# Patient Record
Sex: MALE | Race: WHITE | NOT HISPANIC OR LATINO | Employment: OTHER | URBAN - METROPOLITAN AREA
[De-identification: names, ages, dates, MRNs, and addresses within clinical notes are randomized per-mention and may not be internally consistent; named-entity substitution may affect disease eponyms.]

---

## 2023-03-02 ENCOUNTER — HOSPITAL ENCOUNTER (INPATIENT)
Facility: MEDICAL CENTER | Age: 73
LOS: 3 days | DRG: 683 | End: 2023-03-05
Attending: EMERGENCY MEDICINE | Admitting: HOSPITALIST
Payer: MEDICARE

## 2023-03-02 ENCOUNTER — APPOINTMENT (OUTPATIENT)
Dept: RADIOLOGY | Facility: MEDICAL CENTER | Age: 73
DRG: 683 | End: 2023-03-02
Attending: HOSPITALIST
Payer: MEDICARE

## 2023-03-02 DIAGNOSIS — R55 NEAR SYNCOPE: ICD-10-CM

## 2023-03-02 DIAGNOSIS — I95.9 HYPOTENSION, UNSPECIFIED HYPOTENSION TYPE: ICD-10-CM

## 2023-03-02 DIAGNOSIS — R79.89 ELEVATED LACTIC ACID LEVEL: ICD-10-CM

## 2023-03-02 DIAGNOSIS — R11.10 VOMITING AND DIARRHEA: ICD-10-CM

## 2023-03-02 DIAGNOSIS — R19.7 VOMITING AND DIARRHEA: ICD-10-CM

## 2023-03-02 DIAGNOSIS — E86.0 DEHYDRATION: ICD-10-CM

## 2023-03-02 PROBLEM — N17.9 ACUTE RENAL FAILURE (HCC): Status: ACTIVE | Noted: 2023-03-02

## 2023-03-02 PROBLEM — R11.2 INTRACTABLE NAUSEA AND VOMITING: Status: ACTIVE | Noted: 2023-03-02

## 2023-03-02 PROBLEM — R73.9 HYPERGLYCEMIA: Status: ACTIVE | Noted: 2023-03-02

## 2023-03-02 PROBLEM — K56.7 ILEUS (HCC): Status: ACTIVE | Noted: 2023-03-02

## 2023-03-02 PROBLEM — E87.20 LACTIC ACIDOSIS: Status: ACTIVE | Noted: 2023-03-02

## 2023-03-02 PROBLEM — I10 HYPERTENSION: Status: ACTIVE | Noted: 2023-03-02

## 2023-03-02 PROBLEM — R14.0 ABDOMINAL DISTENTION: Status: ACTIVE | Noted: 2023-03-02

## 2023-03-02 LAB
ALBUMIN SERPL BCP-MCNC: 4.4 G/DL (ref 3.2–4.9)
ALBUMIN/GLOB SERPL: 1.6 G/DL
ALP SERPL-CCNC: 43 U/L (ref 30–99)
ALT SERPL-CCNC: 29 U/L (ref 2–50)
ANION GAP SERPL CALC-SCNC: 14 MMOL/L (ref 7–16)
AST SERPL-CCNC: 21 U/L (ref 12–45)
BASOPHILS # BLD AUTO: 0.2 % (ref 0–1.8)
BASOPHILS # BLD: 0.02 K/UL (ref 0–0.12)
BILIRUB SERPL-MCNC: 1 MG/DL (ref 0.1–1.5)
BUN SERPL-MCNC: 28 MG/DL (ref 8–22)
CALCIUM ALBUM COR SERPL-MCNC: 8.9 MG/DL (ref 8.5–10.5)
CALCIUM SERPL-MCNC: 9.2 MG/DL (ref 8.5–10.5)
CHLORIDE SERPL-SCNC: 109 MMOL/L (ref 96–112)
CO2 SERPL-SCNC: 22 MMOL/L (ref 20–33)
CREAT SERPL-MCNC: 2.51 MG/DL (ref 0.5–1.4)
EKG IMPRESSION: NORMAL
EOSINOPHIL # BLD AUTO: 0.06 K/UL (ref 0–0.51)
EOSINOPHIL NFR BLD: 0.6 % (ref 0–6.9)
ERYTHROCYTE [DISTWIDTH] IN BLOOD BY AUTOMATED COUNT: 47.3 FL (ref 35.9–50)
GFR SERPLBLD CREATININE-BSD FMLA CKD-EPI: 27 ML/MIN/1.73 M 2
GLOBULIN SER CALC-MCNC: 2.7 G/DL (ref 1.9–3.5)
GLUCOSE SERPL-MCNC: 137 MG/DL (ref 65–99)
HCT VFR BLD AUTO: 52.8 % (ref 42–52)
HGB BLD-MCNC: 17 G/DL (ref 14–18)
IMM GRANULOCYTES # BLD AUTO: 0.05 K/UL (ref 0–0.11)
IMM GRANULOCYTES NFR BLD AUTO: 0.5 % (ref 0–0.9)
INR PPP: 1.2 (ref 0.87–1.13)
LACTATE SERPL-SCNC: 1.9 MMOL/L (ref 0.5–2)
LACTATE SERPL-SCNC: 2.1 MMOL/L (ref 0.5–2)
LIPASE SERPL-CCNC: 24 U/L (ref 11–82)
LYMPHOCYTES # BLD AUTO: 0.28 K/UL (ref 1–4.8)
LYMPHOCYTES NFR BLD: 2.8 % (ref 22–41)
MAGNESIUM SERPL-MCNC: 1.9 MG/DL (ref 1.5–2.5)
MCH RBC QN AUTO: 28.6 PG (ref 27–33)
MCHC RBC AUTO-ENTMCNC: 32.2 G/DL (ref 33.7–35.3)
MCV RBC AUTO: 88.7 FL (ref 81.4–97.8)
MONOCYTES # BLD AUTO: 0.97 K/UL (ref 0–0.85)
MONOCYTES NFR BLD AUTO: 9.8 % (ref 0–13.4)
NEUTROPHILS # BLD AUTO: 8.47 K/UL (ref 1.82–7.42)
NEUTROPHILS NFR BLD: 86.1 % (ref 44–72)
NRBC # BLD AUTO: 0 K/UL
NRBC BLD-RTO: 0 /100 WBC
PLATELET # BLD AUTO: 151 K/UL (ref 164–446)
PMV BLD AUTO: 13 FL (ref 9–12.9)
POTASSIUM SERPL-SCNC: 4.5 MMOL/L (ref 3.6–5.5)
PROT SERPL-MCNC: 7.1 G/DL (ref 6–8.2)
PROTHROMBIN TIME: 15.1 SEC (ref 12–14.6)
RBC # BLD AUTO: 5.95 M/UL (ref 4.7–6.1)
SODIUM SERPL-SCNC: 145 MMOL/L (ref 135–145)
TROPONIN T SERPL-MCNC: 10 NG/L (ref 6–19)
WBC # BLD AUTO: 9.9 K/UL (ref 4.8–10.8)

## 2023-03-02 PROCEDURE — 83690 ASSAY OF LIPASE: CPT

## 2023-03-02 PROCEDURE — 84484 ASSAY OF TROPONIN QUANT: CPT

## 2023-03-02 PROCEDURE — 83605 ASSAY OF LACTIC ACID: CPT

## 2023-03-02 PROCEDURE — 85610 PROTHROMBIN TIME: CPT

## 2023-03-02 PROCEDURE — 84300 ASSAY OF URINE SODIUM: CPT

## 2023-03-02 PROCEDURE — 700111 HCHG RX REV CODE 636 W/ 250 OVERRIDE (IP): Performed by: EMERGENCY MEDICINE

## 2023-03-02 PROCEDURE — A9270 NON-COVERED ITEM OR SERVICE: HCPCS | Performed by: HOSPITALIST

## 2023-03-02 PROCEDURE — 74018 RADEX ABDOMEN 1 VIEW: CPT

## 2023-03-02 PROCEDURE — 700102 HCHG RX REV CODE 250 W/ 637 OVERRIDE(OP): Performed by: HOSPITALIST

## 2023-03-02 PROCEDURE — 82436 ASSAY OF URINE CHLORIDE: CPT

## 2023-03-02 PROCEDURE — 99285 EMERGENCY DEPT VISIT HI MDM: CPT

## 2023-03-02 PROCEDURE — 36415 COLL VENOUS BLD VENIPUNCTURE: CPT

## 2023-03-02 PROCEDURE — 87040 BLOOD CULTURE FOR BACTERIA: CPT

## 2023-03-02 PROCEDURE — 700111 HCHG RX REV CODE 636 W/ 250 OVERRIDE (IP): Performed by: HOSPITALIST

## 2023-03-02 PROCEDURE — 93005 ELECTROCARDIOGRAM TRACING: CPT | Performed by: EMERGENCY MEDICINE

## 2023-03-02 PROCEDURE — 80053 COMPREHEN METABOLIC PANEL: CPT

## 2023-03-02 PROCEDURE — 770001 HCHG ROOM/CARE - MED/SURG/GYN PRIV*

## 2023-03-02 PROCEDURE — 700105 HCHG RX REV CODE 258: Performed by: EMERGENCY MEDICINE

## 2023-03-02 PROCEDURE — 81001 URINALYSIS AUTO W/SCOPE: CPT

## 2023-03-02 PROCEDURE — 83735 ASSAY OF MAGNESIUM: CPT

## 2023-03-02 PROCEDURE — 82043 UR ALBUMIN QUANTITATIVE: CPT

## 2023-03-02 PROCEDURE — 96374 THER/PROPH/DIAG INJ IV PUSH: CPT

## 2023-03-02 PROCEDURE — 84133 ASSAY OF URINE POTASSIUM: CPT

## 2023-03-02 PROCEDURE — 99223 1ST HOSP IP/OBS HIGH 75: CPT | Mod: AI | Performed by: HOSPITALIST

## 2023-03-02 PROCEDURE — 84156 ASSAY OF PROTEIN URINE: CPT

## 2023-03-02 PROCEDURE — 82570 ASSAY OF URINE CREATININE: CPT | Mod: 91

## 2023-03-02 PROCEDURE — 85025 COMPLETE CBC W/AUTO DIFF WBC: CPT

## 2023-03-02 PROCEDURE — 700105 HCHG RX REV CODE 258: Performed by: HOSPITALIST

## 2023-03-02 RX ORDER — GABAPENTIN 300 MG/1
300 CAPSULE ORAL 3 TIMES DAILY PRN
COMMUNITY

## 2023-03-02 RX ORDER — PROPRANOLOL HYDROCHLORIDE 10 MG/1
10 TABLET ORAL 2 TIMES DAILY
Status: DISCONTINUED | OUTPATIENT
Start: 2023-03-02 | End: 2023-03-03

## 2023-03-02 RX ORDER — ONDANSETRON 2 MG/ML
4 INJECTION INTRAMUSCULAR; INTRAVENOUS EVERY 4 HOURS PRN
Status: DISCONTINUED | OUTPATIENT
Start: 2023-03-02 | End: 2023-03-05 | Stop reason: HOSPADM

## 2023-03-02 RX ORDER — ROSUVASTATIN CALCIUM 5 MG/1
5 TABLET, COATED ORAL EVERY EVENING
COMMUNITY

## 2023-03-02 RX ORDER — LEVOTHYROXINE SODIUM 175 UG/1
175 TABLET ORAL
COMMUNITY

## 2023-03-02 RX ORDER — CHLORAL HYDRATE 500 MG
1000 CAPSULE ORAL 2 TIMES DAILY
COMMUNITY

## 2023-03-02 RX ORDER — UBIDECARENONE 200 MG
200 CAPSULE ORAL DAILY
COMMUNITY

## 2023-03-02 RX ORDER — TRAZODONE HYDROCHLORIDE 100 MG/1
50 TABLET ORAL NIGHTLY
Status: DISCONTINUED | OUTPATIENT
Start: 2023-03-02 | End: 2023-03-03

## 2023-03-02 RX ORDER — SODIUM CHLORIDE, SODIUM LACTATE, POTASSIUM CHLORIDE, CALCIUM CHLORIDE 600; 310; 30; 20 MG/100ML; MG/100ML; MG/100ML; MG/100ML
1000 INJECTION, SOLUTION INTRAVENOUS ONCE
Status: COMPLETED | OUTPATIENT
Start: 2023-03-02 | End: 2023-03-02

## 2023-03-02 RX ORDER — VALSARTAN 160 MG/1
160 TABLET ORAL DAILY
Status: ON HOLD | COMMUNITY
End: 2023-03-05

## 2023-03-02 RX ORDER — HYDROXYCHLOROQUINE SULFATE 200 MG/1
200 TABLET, FILM COATED ORAL 2 TIMES DAILY
COMMUNITY

## 2023-03-02 RX ORDER — SODIUM CHLORIDE, SODIUM LACTATE, POTASSIUM CHLORIDE, CALCIUM CHLORIDE 600; 310; 30; 20 MG/100ML; MG/100ML; MG/100ML; MG/100ML
INJECTION, SOLUTION INTRAVENOUS CONTINUOUS
Status: DISCONTINUED | OUTPATIENT
Start: 2023-03-02 | End: 2023-03-05

## 2023-03-02 RX ORDER — SODIUM CHLORIDE, SODIUM LACTATE, POTASSIUM CHLORIDE, AND CALCIUM CHLORIDE .6; .31; .03; .02 G/100ML; G/100ML; G/100ML; G/100ML
30 INJECTION, SOLUTION INTRAVENOUS ONCE
Status: DISCONTINUED | OUTPATIENT
Start: 2023-03-02 | End: 2023-03-02

## 2023-03-02 RX ORDER — ONDANSETRON 2 MG/ML
4 INJECTION INTRAMUSCULAR; INTRAVENOUS ONCE
Status: COMPLETED | OUTPATIENT
Start: 2023-03-02 | End: 2023-03-02

## 2023-03-02 RX ORDER — LEVOCETIRIZINE DIHYDROCHLORIDE 5 MG/1
5 TABLET, FILM COATED ORAL DAILY
COMMUNITY

## 2023-03-02 RX ORDER — IBUPROFEN 200 MG
200-600 TABLET ORAL EVERY 6 HOURS PRN
COMMUNITY

## 2023-03-02 RX ORDER — LEVOTHYROXINE SODIUM 175 UG/1
175 TABLET ORAL
Status: DISCONTINUED | OUTPATIENT
Start: 2023-03-03 | End: 2023-03-03

## 2023-03-02 RX ORDER — TRAZODONE HYDROCHLORIDE 50 MG/1
50 TABLET ORAL NIGHTLY
COMMUNITY

## 2023-03-02 RX ORDER — ROSUVASTATIN CALCIUM 5 MG/1
5 TABLET, COATED ORAL EVERY EVENING
Status: DISCONTINUED | OUTPATIENT
Start: 2023-03-02 | End: 2023-03-03

## 2023-03-02 RX ORDER — TAMSULOSIN HYDROCHLORIDE 0.4 MG/1
0.4 CAPSULE ORAL 2 TIMES DAILY
COMMUNITY

## 2023-03-02 RX ORDER — PROPRANOLOL HYDROCHLORIDE 10 MG/1
10 TABLET ORAL 2 TIMES DAILY
COMMUNITY

## 2023-03-02 RX ORDER — SODIUM CHLORIDE 9 MG/ML
1000 INJECTION, SOLUTION INTRAVENOUS ONCE
Status: COMPLETED | OUTPATIENT
Start: 2023-03-02 | End: 2023-03-02

## 2023-03-02 RX ORDER — ACETAMINOPHEN 325 MG/1
650 TABLET ORAL EVERY 6 HOURS PRN
Status: DISCONTINUED | OUTPATIENT
Start: 2023-03-02 | End: 2023-03-03

## 2023-03-02 RX ORDER — MULTIVIT-MINERALS/FA/LYCOPENE 400-370MCG
1 TABLET ORAL DAILY
COMMUNITY

## 2023-03-02 RX ORDER — ONDANSETRON 4 MG/1
4 TABLET, ORALLY DISINTEGRATING ORAL EVERY 4 HOURS PRN
Status: DISCONTINUED | OUTPATIENT
Start: 2023-03-02 | End: 2023-03-03

## 2023-03-02 RX ADMIN — SODIUM CHLORIDE 1000 ML: 9 INJECTION, SOLUTION INTRAVENOUS at 16:18

## 2023-03-02 RX ADMIN — ONDANSETRON 4 MG: 2 INJECTION INTRAMUSCULAR; INTRAVENOUS at 21:17

## 2023-03-02 RX ADMIN — ONDANSETRON 4 MG: 2 INJECTION INTRAMUSCULAR; INTRAVENOUS at 16:39

## 2023-03-02 RX ADMIN — SODIUM CHLORIDE, POTASSIUM CHLORIDE, SODIUM LACTATE AND CALCIUM CHLORIDE 1000 ML: 600; 310; 30; 20 INJECTION, SOLUTION INTRAVENOUS at 17:51

## 2023-03-02 RX ADMIN — SODIUM CHLORIDE, POTASSIUM CHLORIDE, SODIUM LACTATE AND CALCIUM CHLORIDE: 600; 310; 30; 20 INJECTION, SOLUTION INTRAVENOUS at 21:12

## 2023-03-02 RX ADMIN — ROSUVASTATIN CALCIUM 5 MG: 5 TABLET, COATED ORAL at 21:07

## 2023-03-02 RX ADMIN — TRAZODONE HYDROCHLORIDE 50 MG: 100 TABLET ORAL at 21:07

## 2023-03-02 RX ADMIN — ACETAMINOPHEN 650 MG: 325 TABLET, FILM COATED ORAL at 20:03

## 2023-03-02 ASSESSMENT — LIFESTYLE VARIABLES
TOTAL SCORE: 0
ON A TYPICAL DAY WHEN YOU DRINK ALCOHOL HOW MANY DRINKS DO YOU HAVE: 0
HAVE PEOPLE ANNOYED YOU BY CRITICIZING YOUR DRINKING: NO
TOTAL SCORE: 0
CONSUMPTION TOTAL: NEGATIVE
HOW MANY TIMES IN THE PAST YEAR HAVE YOU HAD 5 OR MORE DRINKS IN A DAY: 0
AVERAGE NUMBER OF DAYS PER WEEK YOU HAVE A DRINK CONTAINING ALCOHOL: 0
EVER HAD A DRINK FIRST THING IN THE MORNING TO STEADY YOUR NERVES TO GET RID OF A HANGOVER: NO
HAVE YOU EVER FELT YOU SHOULD CUT DOWN ON YOUR DRINKING: NO
HAVE PEOPLE ANNOYED YOU BY CRITICIZING YOUR DRINKING: NO
DO YOU DRINK ALCOHOL: NO
TOTAL SCORE: 0
TOTAL SCORE: 0
ALCOHOL_USE: NO
EVER FELT BAD OR GUILTY ABOUT YOUR DRINKING: NO
AVERAGE NUMBER OF DAYS PER WEEK YOU HAVE A DRINK CONTAINING ALCOHOL: 0
SUBSTANCE_ABUSE: 0
TOTAL SCORE: 0
EVER FELT BAD OR GUILTY ABOUT YOUR DRINKING: NO
TOTAL SCORE: 0
EVER HAD A DRINK FIRST THING IN THE MORNING TO STEADY YOUR NERVES TO GET RID OF A HANGOVER: NO
HAVE YOU EVER FELT YOU SHOULD CUT DOWN ON YOUR DRINKING: NO
CONSUMPTION TOTAL: NEGATIVE
HOW MANY TIMES IN THE PAST YEAR HAVE YOU HAD 5 OR MORE DRINKS IN A DAY: 0
ON A TYPICAL DAY WHEN YOU DRINK ALCOHOL HOW MANY DRINKS DO YOU HAVE: 0
DOES PATIENT WANT TO STOP DRINKING: NO

## 2023-03-02 ASSESSMENT — ENCOUNTER SYMPTOMS
FALLS: 0
CONSTIPATION: 0
HEADACHES: 0
MYALGIAS: 0
PALPITATIONS: 0
TINGLING: 0
HEMOPTYSIS: 0
WEAKNESS: 1
SINUS PAIN: 0
STRIDOR: 0
NAUSEA: 1
FLANK PAIN: 0
ORTHOPNEA: 0
BLURRED VISION: 0
BLOOD IN STOOL: 0
SPUTUM PRODUCTION: 0
DIARRHEA: 0
DOUBLE VISION: 0
DEPRESSION: 0
CHILLS: 0
HALLUCINATIONS: 0
POLYDIPSIA: 0
DIZZINESS: 1
PHOTOPHOBIA: 0
WHEEZING: 0
SHORTNESS OF BREATH: 0
TREMORS: 0
SORE THROAT: 0
BRUISES/BLEEDS EASILY: 0
PND: 0
VOMITING: 1
CLAUDICATION: 0
ABDOMINAL PAIN: 1
NECK PAIN: 0
FEVER: 0
HEARTBURN: 0
EYE PAIN: 0
DIAPHORESIS: 0
COUGH: 0
BACK PAIN: 0

## 2023-03-02 ASSESSMENT — COGNITIVE AND FUNCTIONAL STATUS - GENERAL
MOBILITY SCORE: 23
WALKING IN HOSPITAL ROOM: A LITTLE
SUGGESTED CMS G CODE MODIFIER MOBILITY: CI
DAILY ACTIVITIY SCORE: 24
SUGGESTED CMS G CODE MODIFIER DAILY ACTIVITY: CH

## 2023-03-02 ASSESSMENT — PATIENT HEALTH QUESTIONNAIRE - PHQ9
SUM OF ALL RESPONSES TO PHQ9 QUESTIONS 1 AND 2: 0
2. FEELING DOWN, DEPRESSED, IRRITABLE, OR HOPELESS: NOT AT ALL
1. LITTLE INTEREST OR PLEASURE IN DOING THINGS: NOT AT ALL

## 2023-03-02 ASSESSMENT — PAIN DESCRIPTION - PAIN TYPE: TYPE: ACUTE PAIN

## 2023-03-02 ASSESSMENT — FIBROSIS 4 INDEX: FIB4 SCORE: 1.81

## 2023-03-02 NOTE — ED TRIAGE NOTES
"Chief Complaint   Patient presents with    Nausea/Vomiting/Diarrhea     BIBA for vomiting and diarrhea since last night. Pt reports several episodes of near syncope. BP 72/52 for medics, increased to 92/52 with 2500cc fluids.Currently 77/49. Denies dizziness while laying in bed. Recent travel to [place] on Tuesday.     BP (!) 77/49   Pulse 83   Temp 36.5 °C (97.7 °F) (Temporal)   Resp 18   Ht 1.727 m (5' 8\")   Wt 93 kg (205 lb)   SpO2 94%   BMI 31.17 kg/m²     Pt axox4. BP improved to 85/50.  "

## 2023-03-03 ENCOUNTER — APPOINTMENT (OUTPATIENT)
Dept: RADIOLOGY | Facility: MEDICAL CENTER | Age: 73
DRG: 683 | End: 2023-03-03
Payer: MEDICARE

## 2023-03-03 ENCOUNTER — HOSPITAL ENCOUNTER (OUTPATIENT)
Dept: RADIOLOGY | Facility: MEDICAL CENTER | Age: 73
End: 2023-03-03
Attending: HOSPITALIST
Payer: MEDICARE

## 2023-03-03 ENCOUNTER — APPOINTMENT (OUTPATIENT)
Dept: RADIOLOGY | Facility: MEDICAL CENTER | Age: 73
DRG: 683 | End: 2023-03-03
Attending: HOSPITALIST
Payer: MEDICARE

## 2023-03-03 PROBLEM — R19.7 NAUSEA, VOMITING AND DIARRHEA: Status: ACTIVE | Noted: 2023-03-02

## 2023-03-03 LAB
ALBUMIN SERPL BCP-MCNC: 3.6 G/DL (ref 3.2–4.9)
ALBUMIN/GLOB SERPL: 1.5 G/DL
ALP SERPL-CCNC: 34 U/L (ref 30–99)
ALT SERPL-CCNC: 30 U/L (ref 2–50)
ANION GAP SERPL CALC-SCNC: 11 MMOL/L (ref 7–16)
ANION GAP SERPL CALC-SCNC: 13 MMOL/L (ref 7–16)
APPEARANCE UR: CLEAR
AST SERPL-CCNC: 24 U/L (ref 12–45)
BACTERIA #/AREA URNS HPF: ABNORMAL /HPF
BASOPHILS # BLD AUTO: 0.2 % (ref 0–1.8)
BASOPHILS # BLD: 0.01 K/UL (ref 0–0.12)
BILIRUB SERPL-MCNC: 0.6 MG/DL (ref 0.1–1.5)
BILIRUB UR QL STRIP.AUTO: ABNORMAL
BUN SERPL-MCNC: 24 MG/DL (ref 8–22)
BUN SERPL-MCNC: 34 MG/DL (ref 8–22)
CALCIUM ALBUM COR SERPL-MCNC: 8.6 MG/DL (ref 8.5–10.5)
CALCIUM SERPL-MCNC: 8.3 MG/DL (ref 8.5–10.5)
CALCIUM SERPL-MCNC: 8.5 MG/DL (ref 8.5–10.5)
CAOX CRY #/AREA URNS HPF: ABNORMAL /HPF
CHLORIDE SERPL-SCNC: 107 MMOL/L (ref 96–112)
CHLORIDE SERPL-SCNC: 109 MMOL/L (ref 96–112)
CHLORIDE UR-SCNC: <20 MMOL/L
CO2 SERPL-SCNC: 22 MMOL/L (ref 20–33)
CO2 SERPL-SCNC: 25 MMOL/L (ref 20–33)
COLOR UR: YELLOW
CREAT SERPL-MCNC: 1.41 MG/DL (ref 0.5–1.4)
CREAT SERPL-MCNC: 2.28 MG/DL (ref 0.5–1.4)
CREAT UR-MCNC: 415.09 MG/DL
CREAT UR-MCNC: 418.74 MG/DL
EOSINOPHIL # BLD AUTO: 0.21 K/UL (ref 0–0.51)
EOSINOPHIL NFR BLD: 4 % (ref 0–6.9)
EPI CELLS #/AREA URNS HPF: NEGATIVE /HPF
ERYTHROCYTE [DISTWIDTH] IN BLOOD BY AUTOMATED COUNT: 46.8 FL (ref 35.9–50)
EST. AVERAGE GLUCOSE BLD GHB EST-MCNC: 103 MG/DL
GFR SERPLBLD CREATININE-BSD FMLA CKD-EPI: 30 ML/MIN/1.73 M 2
GFR SERPLBLD CREATININE-BSD FMLA CKD-EPI: 53 ML/MIN/1.73 M 2
GLOBULIN SER CALC-MCNC: 2.4 G/DL (ref 1.9–3.5)
GLUCOSE SERPL-MCNC: 100 MG/DL (ref 65–99)
GLUCOSE SERPL-MCNC: 110 MG/DL (ref 65–99)
GLUCOSE UR STRIP.AUTO-MCNC: NEGATIVE MG/DL
HBA1C MFR BLD: 5.2 % (ref 4–5.6)
HCT VFR BLD AUTO: 45.7 % (ref 42–52)
HGB BLD-MCNC: 14.9 G/DL (ref 14–18)
HYALINE CASTS #/AREA URNS LPF: ABNORMAL /LPF
IMM GRANULOCYTES # BLD AUTO: 0.02 K/UL (ref 0–0.11)
IMM GRANULOCYTES NFR BLD AUTO: 0.4 % (ref 0–0.9)
KETONES UR STRIP.AUTO-MCNC: ABNORMAL MG/DL
LACTOFERRIN STL QL IA: POSITIVE
LEUKOCYTE ESTERASE UR QL STRIP.AUTO: NEGATIVE
LYMPHOCYTES # BLD AUTO: 0.83 K/UL (ref 1–4.8)
LYMPHOCYTES NFR BLD: 15.6 % (ref 22–41)
MAGNESIUM SERPL-MCNC: 1.7 MG/DL (ref 1.5–2.5)
MCH RBC QN AUTO: 28.8 PG (ref 27–33)
MCHC RBC AUTO-ENTMCNC: 32.6 G/DL (ref 33.7–35.3)
MCV RBC AUTO: 88.4 FL (ref 81.4–97.8)
MICRO URNS: ABNORMAL
MICROALBUMIN UR-MCNC: 14.3 MG/DL
MICROALBUMIN/CREAT UR: 34 MG/G (ref 0–30)
MONOCYTES # BLD AUTO: 0.94 K/UL (ref 0–0.85)
MONOCYTES NFR BLD AUTO: 17.7 % (ref 0–13.4)
MUCOUS THREADS #/AREA URNS HPF: ABNORMAL /HPF
NEUTROPHILS # BLD AUTO: 3.3 K/UL (ref 1.82–7.42)
NEUTROPHILS NFR BLD: 62.1 % (ref 44–72)
NITRITE UR QL STRIP.AUTO: NEGATIVE
NRBC # BLD AUTO: 0 K/UL
NRBC BLD-RTO: 0 /100 WBC
PH UR STRIP.AUTO: 5.5 [PH] (ref 5–8)
PHOSPHATE SERPL-MCNC: 2.8 MG/DL (ref 2.5–4.5)
PLATELET # BLD AUTO: 117 K/UL (ref 164–446)
PMV BLD AUTO: 13.1 FL (ref 9–12.9)
POTASSIUM SERPL-SCNC: 3.9 MMOL/L (ref 3.6–5.5)
POTASSIUM SERPL-SCNC: 3.9 MMOL/L (ref 3.6–5.5)
POTASSIUM UR-SCNC: >100 MMOL/L
PROT SERPL-MCNC: 6 G/DL (ref 6–8.2)
PROT UR QL STRIP: 30 MG/DL
PROT UR-MCNC: 48 MG/DL (ref 0–15)
PROT/CREAT UR: 116 MG/G (ref 15–68)
RBC # BLD AUTO: 5.17 M/UL (ref 4.7–6.1)
RBC # URNS HPF: ABNORMAL /HPF
RBC UR QL AUTO: NEGATIVE
SODIUM SERPL-SCNC: 142 MMOL/L (ref 135–145)
SODIUM SERPL-SCNC: 145 MMOL/L (ref 135–145)
SODIUM UR-SCNC: 24 MMOL/L
SP GR UR STRIP.AUTO: >=1.03
TSH SERPL DL<=0.005 MIU/L-ACNC: 0.69 UIU/ML (ref 0.38–5.33)
UROBILINOGEN UR STRIP.AUTO-MCNC: 0.2 MG/DL
WBC # BLD AUTO: 5.3 K/UL (ref 4.8–10.8)
WBC #/AREA URNS HPF: ABNORMAL /HPF

## 2023-03-03 PROCEDURE — 85025 COMPLETE CBC W/AUTO DIFF WBC: CPT

## 2023-03-03 PROCEDURE — 80048 BASIC METABOLIC PNL TOTAL CA: CPT

## 2023-03-03 PROCEDURE — 700105 HCHG RX REV CODE 258: Performed by: HOSPITALIST

## 2023-03-03 PROCEDURE — 74176 CT ABD & PELVIS W/O CONTRAST: CPT

## 2023-03-03 PROCEDURE — 700111 HCHG RX REV CODE 636 W/ 250 OVERRIDE (IP): Performed by: GENERAL PRACTICE

## 2023-03-03 PROCEDURE — 83036 HEMOGLOBIN GLYCOSYLATED A1C: CPT

## 2023-03-03 PROCEDURE — 700105 HCHG RX REV CODE 258

## 2023-03-03 PROCEDURE — A9270 NON-COVERED ITEM OR SERVICE: HCPCS | Performed by: HOSPITALIST

## 2023-03-03 PROCEDURE — 83630 LACTOFERRIN FECAL (QUAL): CPT

## 2023-03-03 PROCEDURE — 99222 1ST HOSP IP/OBS MODERATE 55: CPT | Performed by: SURGERY

## 2023-03-03 PROCEDURE — 700102 HCHG RX REV CODE 250 W/ 637 OVERRIDE(OP): Performed by: HOSPITALIST

## 2023-03-03 PROCEDURE — 84100 ASSAY OF PHOSPHORUS: CPT

## 2023-03-03 PROCEDURE — 770001 HCHG ROOM/CARE - MED/SURG/GYN PRIV*

## 2023-03-03 PROCEDURE — 84443 ASSAY THYROID STIM HORMONE: CPT

## 2023-03-03 PROCEDURE — 83735 ASSAY OF MAGNESIUM: CPT

## 2023-03-03 PROCEDURE — 700111 HCHG RX REV CODE 636 W/ 250 OVERRIDE (IP): Performed by: HOSPITALIST

## 2023-03-03 PROCEDURE — 80053 COMPREHEN METABOLIC PANEL: CPT

## 2023-03-03 PROCEDURE — 36415 COLL VENOUS BLD VENIPUNCTURE: CPT

## 2023-03-03 PROCEDURE — 99233 SBSQ HOSP IP/OBS HIGH 50: CPT | Mod: GC | Performed by: HOSPITALIST

## 2023-03-03 RX ORDER — METOCLOPRAMIDE HYDROCHLORIDE 5 MG/ML
10 INJECTION INTRAMUSCULAR; INTRAVENOUS ONCE
Status: COMPLETED | OUTPATIENT
Start: 2023-03-03 | End: 2023-03-03

## 2023-03-03 RX ORDER — ACETAMINOPHEN 325 MG/1
650 TABLET ORAL EVERY 6 HOURS PRN
Status: DISCONTINUED | OUTPATIENT
Start: 2023-03-03 | End: 2023-03-05 | Stop reason: HOSPADM

## 2023-03-03 RX ORDER — ROSUVASTATIN CALCIUM 5 MG/1
5 TABLET, COATED ORAL EVERY EVENING
Status: DISCONTINUED | OUTPATIENT
Start: 2023-03-03 | End: 2023-03-05 | Stop reason: HOSPADM

## 2023-03-03 RX ORDER — TRAZODONE HYDROCHLORIDE 100 MG/1
50 TABLET ORAL NIGHTLY
Status: DISCONTINUED | OUTPATIENT
Start: 2023-03-03 | End: 2023-03-05 | Stop reason: HOSPADM

## 2023-03-03 RX ORDER — ONDANSETRON 4 MG/1
4 TABLET, ORALLY DISINTEGRATING ORAL EVERY 4 HOURS PRN
Status: DISCONTINUED | OUTPATIENT
Start: 2023-03-03 | End: 2023-03-05 | Stop reason: HOSPADM

## 2023-03-03 RX ORDER — PROPRANOLOL HYDROCHLORIDE 10 MG/1
10 TABLET ORAL 2 TIMES DAILY
Status: DISCONTINUED | OUTPATIENT
Start: 2023-03-03 | End: 2023-03-05 | Stop reason: HOSPADM

## 2023-03-03 RX ORDER — MAGNESIUM SULFATE 1 G/100ML
1 INJECTION INTRAVENOUS ONCE
Status: COMPLETED | OUTPATIENT
Start: 2023-03-03 | End: 2023-03-04

## 2023-03-03 RX ORDER — SODIUM CHLORIDE, SODIUM LACTATE, POTASSIUM CHLORIDE, AND CALCIUM CHLORIDE .6; .31; .03; .02 G/100ML; G/100ML; G/100ML; G/100ML
500 INJECTION, SOLUTION INTRAVENOUS ONCE
Status: COMPLETED | OUTPATIENT
Start: 2023-03-03 | End: 2023-03-04

## 2023-03-03 RX ORDER — LEVOTHYROXINE SODIUM 175 UG/1
175 TABLET ORAL
Status: DISCONTINUED | OUTPATIENT
Start: 2023-03-03 | End: 2023-03-05 | Stop reason: HOSPADM

## 2023-03-03 RX ORDER — ASPIRIN 81 MG/1
81 TABLET, CHEWABLE ORAL DAILY
Status: DISCONTINUED | OUTPATIENT
Start: 2023-03-03 | End: 2023-03-05 | Stop reason: HOSPADM

## 2023-03-03 RX ADMIN — SODIUM CHLORIDE, POTASSIUM CHLORIDE, SODIUM LACTATE AND CALCIUM CHLORIDE: 600; 310; 30; 20 INJECTION, SOLUTION INTRAVENOUS at 13:48

## 2023-03-03 RX ADMIN — MAGNESIUM SULFATE HEPTAHYDRATE 1 G: 1 INJECTION, SOLUTION INTRAVENOUS at 23:46

## 2023-03-03 RX ADMIN — ROSUVASTATIN CALCIUM 5 MG: 5 TABLET, COATED ORAL at 17:13

## 2023-03-03 RX ADMIN — SODIUM CHLORIDE, POTASSIUM CHLORIDE, SODIUM LACTATE AND CALCIUM CHLORIDE: 600; 310; 30; 20 INJECTION, SOLUTION INTRAVENOUS at 20:10

## 2023-03-03 RX ADMIN — PROPRANOLOL HYDROCHLORIDE 10 MG: 10 TABLET ORAL at 17:13

## 2023-03-03 RX ADMIN — SODIUM CHLORIDE, POTASSIUM CHLORIDE, SODIUM LACTATE AND CALCIUM CHLORIDE: 600; 310; 30; 20 INJECTION, SOLUTION INTRAVENOUS at 05:22

## 2023-03-03 RX ADMIN — METOCLOPRAMIDE 10 MG: 5 INJECTION, SOLUTION INTRAMUSCULAR; INTRAVENOUS at 00:34

## 2023-03-03 RX ADMIN — SODIUM CHLORIDE, POTASSIUM CHLORIDE, SODIUM LACTATE AND CALCIUM CHLORIDE 500 ML: 600; 310; 30; 20 INJECTION, SOLUTION INTRAVENOUS at 08:25

## 2023-03-03 RX ADMIN — TRAZODONE HYDROCHLORIDE 50 MG: 100 TABLET ORAL at 22:11

## 2023-03-03 RX ADMIN — ACETAMINOPHEN 650 MG: 325 TABLET, FILM COATED ORAL at 17:20

## 2023-03-03 ASSESSMENT — ENCOUNTER SYMPTOMS
MYALGIAS: 0
ORTHOPNEA: 0
HEARTBURN: 0
SORE THROAT: 0
PND: 0
STRIDOR: 0
WHEEZING: 0
DIZZINESS: 1
FALLS: 0
PALPITATIONS: 0
NAUSEA: 1
DIAPHORESIS: 0
SPUTUM PRODUCTION: 0
VOMITING: 1
CHILLS: 0
DOUBLE VISION: 0
CLAUDICATION: 0
DEPRESSION: 0
BLURRED VISION: 0
POLYDIPSIA: 0
FEVER: 0
SINUS PAIN: 0
HALLUCINATIONS: 0
ABDOMINAL PAIN: 1
TINGLING: 0
FLANK PAIN: 0
CONSTIPATION: 0
BLOOD IN STOOL: 0
TREMORS: 0
HEMOPTYSIS: 0
EYE PAIN: 0
WEAKNESS: 1
PHOTOPHOBIA: 0
BRUISES/BLEEDS EASILY: 0
DIARRHEA: 0
BACK PAIN: 0
HEADACHES: 0
COUGH: 0
NECK PAIN: 0
SHORTNESS OF BREATH: 0

## 2023-03-03 ASSESSMENT — LIFESTYLE VARIABLES: SUBSTANCE_ABUSE: 0

## 2023-03-03 NOTE — PROGRESS NOTES
Pt A&Ox4. Oriented to room and floor. Instructed on how to use the call bell. Pt agreed to call nursing staff when needing to ambulate d/t dizziness in the ER. Denies any dizziness now. Pt nauseous, PRN Zofran administered. No vomiting all shift. Pt up to BR standby assist, Bmx1. More complaints of nausea and heartburn, one time order placed for Reglan. NG tube placed by RODO Valdes. 100mL out brown clear liquid. X-ray to confirm placement. NG tube advanced 2 inches per provider orders. X-ray still reads to be advanced. NG tube advanced another 1.5inches per provider order. X-ray obtained. Minimal changes. Another 1,000mL out NG tube. Morning med held per provider. Pt aware he is strict NPO.Total shift output NG was 1600mL. Pt not experiencing any more nausea. Abdomen less firm and less distended. Pt taken to CT this AM. Pt updated on POC.

## 2023-03-03 NOTE — ASSESSMENT & PLAN NOTE
Improving significantly  Possibly due to food poisoning (positive fecal lactoferrin), no SBO on small bowel follow through   -Trial clear liquid diet and advance as tolerated  -NG tube to suction if not tolerating diet advancement  -Nausea medications ordered  -IV hydration if not tolerating orals  -Stool  cultures, O&P pending  -Follow up outpatient fecal lactoferrin

## 2023-03-03 NOTE — ED PROVIDER NOTES
ED Provider Note    CHIEF COMPLAINT  Chief Complaint   Patient presents with    Nausea/Vomiting/Diarrhea     BIBA for vomiting and diarrhea since last night. Pt reports several episodes of near syncope. BP 72/52 for medics, increased to 92/52 with 250ml fluids.Currently 77/49. Denies dizziness while laying in bed. Recent travel to [place] on Tuesday.       EXTERNAL RECORDS REVIEWED  Other none available    HPI/ROS  LIMITATION TO HISTORY   Select: : None  OUTSIDE HISTORIAN(S):  EMS patient's blood pressure was initially 72/52.  2500 mL of fluid administered increasing it to 92/52.  Patient's wife is at the bedside and gives much of the history as below.  She feels he looks much improved now.    Warner Byrne is a 72 y.o. male with a history of thyroid disease and hypertension who presents for vomiting and diarrhea with dizziness/near syncope.    Patient ate Chinese food with his wife around 4 PM yesterday.  At 8 PM he began vomiting and vomited at least 6 times throughout the night.  Patient also had diarrhea.  The last episode of this was at 9 AM.  At 11 AM the patient, who is here to see his grandson [employer] [facility] [employer], felt like he could ambulate to the [employer].  He drank Gatorade on his way there and proceeded to vomit this up.  He felt dizzy and near syncopal with this as well.  EMS was called and 250 mL (not 2500 mL--in contrast to the nursing note) of fluid was administered.  Patient's blood pressure responded.  To 92/52 patient feels improved now.  His wife feels he looks much better.    Patient took his propranolol, valsartan, and Flomax this morning at 10 AM.  He vomited at 11.    No melena, hematemesis, hematochezia, sick contacts, abdominal pain, chest pain, shortness of breath, rash.    PAST MEDICAL HISTORY   Hypertension  Sjogren's  BPH  Antiphospholipid antibody positive    SURGICAL HISTORY  patient denies any surgical history    FAMILY HISTORY  History reviewed. No pertinent  "family history.    SOCIAL HISTORY  Social History     Tobacco Use    Smoking status: Former     Types: Cigarettes    Smokeless tobacco: Never   Vaping Use    Vaping Use: Never used   Substance and Sexual Activity    Alcohol use: Not Currently    Drug use: Never    Sexual activity: Not on file       CURRENT MEDICATIONS  Propranolol  Losartan  Flomax  Coenzyme Q  Thyroid    ALLERGIES  Allergies   Allergen Reactions    Penicillins Rash     Rash all over body       PHYSICAL EXAM  VITAL SIGNS: BP 97/53   Pulse 78   Temp 36.5 °C (97.7 °F) (Temporal)   Resp 16   Ht 1.727 m (5' 8\")   Wt 93 kg (205 lb)   SpO2 93%   BMI 31.17 kg/m²    General:  WD male with elevated BMI, nontoxic appearing in NAD; A+Ox3; V/S as above; blood pressure 90 systolic, afebrile, not tachycardic  Skin: warm and dry; good color; no rash  HEENT: NCAT; EOMs intact; PERRL; no scleral icterus; dry mouth and lips  Neck: FROM; no LAD, no stridor  Cardiovascular: Regular heart rate and rhythm.  No murmurs, rubs, or gallops; pulses 2+ bilaterally radially  Lungs: No respiratory distress or tachypnea; Clear to auscultation with good air movement bilaterally.  No wheezes, rhonchi, or rales.   Abdomen: BS present; soft; NTND; no rebound, guarding, or rigidity.  No organomegaly or pulsatile mass  Extremities: GIPSON x 4; no e/o trauma; no pedal edema; neg Glen's  Neurologic: CNs III-XII grossly intact; speech clear; distal sensation intact; strength 5/5 UE/LEs  Psychiatric: Appropriate affect, normal mood      DIAGNOSTIC STUDIES / PROCEDURES  EKG  I have independently interpreted this EKG. no delta wave, no arrhythmia, no NM shortening, no QT prolongation, no ST elevation.    LABS  Results for orders placed or performed during the hospital encounter of 03/02/23   CBC WITH DIFFERENTIAL   Result Value Ref Range    WBC 9.9 4.8 - 10.8 K/uL    RBC 5.95 4.70 - 6.10 M/uL    Hemoglobin 17.0 14.0 - 18.0 g/dL    Hematocrit 52.8 (H) 42.0 - 52.0 %    MCV 88.7 81.4 - " 97.8 fL    MCH 28.6 27.0 - 33.0 pg    MCHC 32.2 (L) 33.7 - 35.3 g/dL    RDW 47.3 35.9 - 50.0 fL    Platelet Count 151 (L) 164 - 446 K/uL    MPV 13.0 (H) 9.0 - 12.9 fL    Neutrophils-Polys 86.10 (H) 44.00 - 72.00 %    Lymphocytes 2.80 (L) 22.00 - 41.00 %    Monocytes 9.80 0.00 - 13.40 %    Eosinophils 0.60 0.00 - 6.90 %    Basophils 0.20 0.00 - 1.80 %    Immature Granulocytes 0.50 0.00 - 0.90 %    Nucleated RBC 0.00 /100 WBC    Neutrophils (Absolute) 8.47 (H) 1.82 - 7.42 K/uL    Lymphs (Absolute) 0.28 (L) 1.00 - 4.80 K/uL    Monos (Absolute) 0.97 (H) 0.00 - 0.85 K/uL    Eos (Absolute) 0.06 0.00 - 0.51 K/uL    Baso (Absolute) 0.02 0.00 - 0.12 K/uL    Immature Granulocytes (abs) 0.05 0.00 - 0.11 K/uL    NRBC (Absolute) 0.00 K/uL   COMP METABOLIC PANEL   Result Value Ref Range    Sodium 145 135 - 145 mmol/L    Potassium 4.5 3.6 - 5.5 mmol/L    Chloride 109 96 - 112 mmol/L    Co2 22 20 - 33 mmol/L    Anion Gap 14.0 7.0 - 16.0    Glucose 137 (H) 65 - 99 mg/dL    Bun 28 (H) 8 - 22 mg/dL    Creatinine 2.51 (H) 0.50 - 1.40 mg/dL    Calcium 9.2 8.5 - 10.5 mg/dL    AST(SGOT) 21 12 - 45 U/L    ALT(SGPT) 29 2 - 50 U/L    Alkaline Phosphatase 43 30 - 99 U/L    Total Bilirubin 1.0 0.1 - 1.5 mg/dL    Albumin 4.4 3.2 - 4.9 g/dL    Total Protein 7.1 6.0 - 8.2 g/dL    Globulin 2.7 1.9 - 3.5 g/dL    A-G Ratio 1.6 g/dL   LIPASE   Result Value Ref Range    Lipase 24 11 - 82 U/L   LACTIC ACID   Result Value Ref Range    Lactic Acid 2.1 (H) 0.5 - 2.0 mmol/L   Prothrombin Time   Result Value Ref Range    PT 15.1 (H) 12.0 - 14.6 sec    INR 1.20 (H) 0.87 - 1.13   TROPONIN   Result Value Ref Range    Troponin T 10 6 - 19 ng/L   MAGNESIUM   Result Value Ref Range    Magnesium 1.9 1.5 - 2.5 mg/dL   CORRECTED CALCIUM   Result Value Ref Range    Correct Calcium 8.9 8.5 - 10.5 mg/dL   ESTIMATED GFR   Result Value Ref Range    GFR (CKD-EPI) 27 (A) >60 mL/min/1.73 m 2   EKG   Result Value Ref Range    Report       [facility]  Emergency Dept.    Test Date:  2023  Pt Name:    ROSEY VARGHESE              Department: ER  MRN:        [id number]                      Room:        27  Gender:     Male                         Technician: [id number]  :        1950                   Requested By:HÉCTOR WALDROP  Order #:    [id number]                    Reading MD: HÉCTOR WALDROP MD    Measurements  Intervals                                Axis  Rate:       88                           P:          26  MD:         193                          QRS:        204  QRSD:       96                           T:          64  QT:         334  QTc:        404    Interpretive Statements  Sinus rhythm  Low voltage, precordial leads  Probable right ventricular hypertrophy  Minimal ST elevation, inferior leads  Baseline wander in lead(s) V1,V2,V5  No previous ECG available for comparison  Electronically Signed On 3-2-2023 17:15:17 PST by HÉCTOR WALDROP MD           RADIOLOGY  none    COURSE & MEDICAL DECISION MAKING    ED Observation Status? No; Patient does not meet criteria for ED Observation.     INITIAL ASSESSMENT, COURSE AND PLAN  Care Narrative: This is a 72-year-old male who developed vomiting and diarrhea, possibly from food poisoning or viral in nature, last evening and vomited overnight.  He was hypotensive per EMS.  He was hypotensive upon arrival here.  Patient has a soft, nontender, nonsurgical abdomen.  He denies blood in the emesis or the diarrhea.  I suspect his hypotension is secondary to hypovolemia.  He is eager to get back to [place] where he is from.  He has a flight tomorrow morning.  I advised that we would be checking labs, monitoring his blood pressure with vitals.    Patient's labs demonstrate a normal white blood cell count, thrombocytopenia, lymphopenia, glucose 137, BUN 28, and creatinine of 2.5 with a lactic acid of 2.1.  Patient's INR is 1.2.  Transaminases are normal.    I suspect the lactic acid is due to the  dehydration/hypovolemia with the vomiting and diarrhea.  We will recheck lactic acid after his 2 L of IVF's per sepsis protocol.    6:11 PM  Patient is reevaluated.  He was advised of his lab results, indicating dehydration and LOUIE.  His blood pressure is 100 systolic at this point.  I advised admission for continued rehydration.  He is amenable to this plan.  Paging the hospitalist.    6:39 PM  I discussed the case with Dr. May who agrees to evaluate/hospitalize the patient.      HYDRATION: Based on the patient's presentation of Acute Diarrhea, Acute Kidney Injury, Acute Vomiting, and Hypotension the patient was given IV fluids. IV Hydration was used because oral hydration was not adequate alone. Upon recheck following hydration, the patient was improved with normalizing BP.      DISPOSITION AND DISCUSSIONS  I have discussed management of the patient with the following physicians and KALPANA's:    Yadira    FINAL DIAGNOSIS  1. Vomiting and diarrhea    2. Dehydration    3. Elevated lactic acid level    4. Near syncope    5. Hypotension, unspecified hypotension type        Electronically signed by: Adele Bello M.D., 3/2/2023 4:04 PM

## 2023-03-03 NOTE — ASSESSMENT & PLAN NOTE
Ileus in setting of food poisoning or viral gastroenteritis, no evidence of bowel obstruction on SBFT  -Advance diet as tolerated  -Serial abdominal exams  -IV hydration if not tolerating orals  -NG tube to suction if not tolerating orals

## 2023-03-03 NOTE — CARE PLAN
The patient is Stable - Low risk of patient condition declining or worsening    Shift Goals: NG decompression;  monitor N/V and diarrhea; stool culture   Clinical Goals: Abdominal decompression, Anti-emetics  Patient Goals: Reduce nausea  Family Goals: reduce N/V    Progress made toward(s) clinical / shift goals:  see below    Problem: Psychosocial  Goal: Patient's level of anxiety will decrease  Outcome: Progressing  Goal: Patient's ability to verbalize feelings about condition will improve  Outcome: Progressing  Goal: Patient's ability to re-evaluate and adapt role responsibilities will improve  Outcome: Progressing  Goal: Patient and family will demonstrate ability to cope with life altering diagnosis and/or procedure  Outcome: Progressing  Goal: Spiritual and cultural needs incorporated into hospitalization  Outcome: Progressing     Problem: Knowledge Deficit - Standard  Goal: Patient and family/care givers will demonstrate understanding of plan of care, disease process/condition, diagnostic tests and medications  Outcome: Progressing     Problem: Hemodynamics  Goal: Patient's hemodynamics, fluid balance and neurologic status will be stable or improve  Outcome: Progressing     Problem: Mobility  Goal: Patient's capacity to carry out activities will improve  Outcome: Progressing       Patient is not progressing towards the following goals:      Problem: Nutrition  Goal: Patient's nutritional and fluid intake will be adequate or improve  Outcome: Not Progressing  Goal: Enteral nutrition will be maintained or improve  Outcome: Not Progressing  Goal: Enteral nutrition will be maintained or improve  Outcome: Not Progressing

## 2023-03-03 NOTE — HOSPITAL COURSE
Warner Byrne is a 72 y.o. male with PMHx ventral hernia repair 2020, Sjogren syndrome who presented 3/2/2023  for severe one day of nausea, vomiting, watery diarrhea that started ~6 hours after eating [employer] without hematemesis or hematochezia but with associated dizziness. In the ED he was hypotensive so was given fluids with improvement, and due to concern for bowel obstruction vs. Ileus an NG tube was placed for decompression.

## 2023-03-03 NOTE — PROGRESS NOTES
[facility] Internal Medicine Daily Progress Note    Date of Service  3/3/2023    R Team: R IM White Team   Attending: ABRAHAN Lackey M.d.  Senior Resident: Dr. Deutsch  Intern:  Dr. Wall  Contact Number: [phone]    Chief Complaint  Warner Cook is a 72 y.o. male admitted 3/2/2023 with intractable nausea, vomiting, and diarrhea for three days    Hospital Course  Warner Byrne is a 72 y.o. male with PMHx ventral hernia repair 2020, Sjogren syndrome who presented 3/2/2023  for severe one day of nausea, vomiting, watery diarrhea that started ~6 hours after eating [employer] without hematemesis or hematochezia but with associated dizziness. In the ED he was hypotensive so was given fluids with improvement, and due to concern for bowel obstruction vs. Ileus an NG tube was placed for decompression.     Interval Problem Update  Patient feeling better today somewhat since after NG tube placed, which has drained about 1600 cc bilious fluid. Blood pressure still low this morning 80s/60s, asymptomatic and responsive to fluids so fluid repletion rate increased and bolused with LR. CTAP with concern for ileus vs small bowel obstruction. Pending stool studies, blood cultures negative to date.     I have discussed this patient's plan of care and discharge plan at IDT rounds today with Case Management, Nursing, Nursing leadership, and other members of the IDT team.    Consultants/Specialty  general surgery    Code Status  Full Code    Disposition  Patient is not medically cleared for discharge.   Anticipate discharge to to home with close outpatient follow-up.  I have placed the appropriate orders for post-discharge needs.    Review of Systems  Review of Systems   Constitutional:  Negative for chills, diaphoresis, fever and malaise/fatigue.   HENT:  Negative for congestion, ear discharge, ear pain, hearing loss, nosebleeds, sinus pain, sore throat and tinnitus.    Eyes:  Negative for blurred vision, double vision,  photophobia and pain.   Respiratory:  Negative for cough, hemoptysis, sputum production, shortness of breath, wheezing and stridor.    Cardiovascular:  Negative for chest pain, palpitations, orthopnea, claudication, leg swelling and PND.   Gastrointestinal:  Positive for abdominal pain, nausea and vomiting. Negative for blood in stool, constipation, diarrhea, heartburn and melena.   Genitourinary:  Negative for dysuria, flank pain, frequency, hematuria and urgency.   Musculoskeletal:  Negative for back pain, falls, joint pain, myalgias and neck pain.   Skin:  Negative for itching and rash.   Neurological:  Positive for dizziness and weakness. Negative for tingling, tremors and headaches.   Endo/Heme/Allergies:  Negative for environmental allergies and polydipsia. Does not bruise/bleed easily.   Psychiatric/Behavioral:  Negative for depression, hallucinations, substance abuse and suicidal ideas.       Physical Exam  Temp:  [36.3 °C (97.4 °F)-37.1 °C (98.7 °F)] 36.5 °C (97.7 °F)  Pulse:  [78-98] 89  Resp:  [13-20] 18  BP: ()/(49-75) 121/75  SpO2:  [90 %-96 %] 90 %    Physical Exam  Vitals and nursing note reviewed.   Constitutional:       General: He is not in acute distress.     Appearance: Normal appearance. He is not ill-appearing, toxic-appearing or diaphoretic.   HENT:      Head: Normocephalic and atraumatic.      Nose: No congestion or rhinorrhea.      Mouth/Throat:      Pharynx: No posterior oropharyngeal erythema.   Eyes:      General: No scleral icterus.        Right eye: No discharge.   Cardiovascular:      Rate and Rhythm: Normal rate and regular rhythm.      Pulses: Normal pulses.      Heart sounds: Normal heart sounds. No murmur heard.    No friction rub. No gallop.   Pulmonary:      Effort: Pulmonary effort is normal. No respiratory distress.      Breath sounds: Normal breath sounds. No stridor. No wheezing, rhonchi or rales.   Abdominal:      General: There is distension.      Tenderness: There is  abdominal tenderness. There is no guarding or rebound.      Comments: Bowel sounds hyperactive LUQ with slightly high pitch, relatively normoactive and normal pitch elsewhere. No tinkling   Musculoskeletal:         General: No swelling, tenderness, deformity or signs of injury.      Cervical back: Normal range of motion.      Right lower leg: No edema.      Left lower leg: No edema.   Skin:     Coloration: Skin is not jaundiced or pale.      Findings: No bruising, erythema, lesion or rash.   Neurological:      General: No focal deficit present.      Mental Status: He is alert and oriented to person, place, and time.   Psychiatric:         Mood and Affect: Mood normal.         Behavior: Behavior normal.       Fluids    Intake/Output Summary (Last 24 hours) at 3/3/2023 1349  Last data filed at 3/3/2023 0520  Gross per 24 hour   Intake 1000 ml   Output 1800 ml   Net -800 ml       Laboratory  Recent Labs     03/02/23  1600 03/03/23  0519   WBC 9.9 5.3   RBC 5.95 5.17   HEMOGLOBIN 17.0 14.9   HEMATOCRIT 52.8* 45.7   MCV 88.7 88.4   MCH 28.6 28.8   MCHC 32.2* 32.6*   RDW 47.3 46.8   PLATELETCT 151* 117*   MPV 13.0* 13.1*     Recent Labs     03/02/23  1600 03/03/23  0519   SODIUM 145 142   POTASSIUM 4.5 3.9   CHLORIDE 109 107   CO2 22 22   GLUCOSE 137* 110*   BUN 28* 34*   CREATININE 2.51* 2.28*   CALCIUM 9.2 8.3*     Recent Labs     03/02/23  1600   INR 1.20*               Imaging  DX-ABDOMEN FOR TUBE PLACEMENT   Final Result      NG tube extends into the fundus of the stomach.      CT-ABDOMEN-PELVIS W/O   Final Result         1.  Fluid-filled reactive upper abdominal small bowel loops, appearance suggests component of ileus and/or enteritis.   2.  Trace pericardial effusion   3.  Atherosclerosis and atherosclerotic coronary artery disease      DX-ABDOMEN FOR TUBE PLACEMENT   Final Result         1.  Nasogastric tube with side-port at the gastroesophageal junction, position essentially unchanged.   2.  Retrocardiac  opacity favoring atelectasis or infiltrate, similar to prior study.   3.  Cardiomegaly      DX-ABDOMEN FOR TUBE PLACEMENT   Final Result         1.  Nasogastric tube with side-port at the gastroesophageal junction, recommend advancement.   2.  Retrocardiac opacity favoring atelectasis or infiltrate, similar to prior study.   3.  Cardiomegaly      DX-ABDOMEN FOR TUBE PLACEMENT   Final Result         1.  Nasogastric tube terminates at the gastroesophageal junction, recommend advancement.   2.  Retrocardiac opacity favoring atelectasis or infiltrate.   3.  Cardiomegaly      [id number] VIEW   Final Result      1.  There are some dilated air-filled loops of small bowel centrally in the abdomen. Findings could be due to partial or intermittent mechanical obstruction versus ileus.      2.  Possible small bowel wall thickening noted in the right lower quadrant.                        Assessment/Plan  Problem Representation: 73 YO M admitted 3/2/23 for intractable nausea, vomiting, diarrhea concerning for ileus versus small bowel obstruction    * Ileus (HCC)  Assessment & Plan  Ileus in setting of food poisoning or viral gastroenteritis versus small bowel obstruction  -Serial abdominal exams and x-rays  -IV hydration  -NG tube, bowel rest  -Surgery consult pending    Nausea, vomiting and diarrhea  Assessment & Plan  Possibly due to food poisoning but may be additional small bowel obstruction  -Surgery consult  -NG tube to suction and bowel rest  -Nausea medications ordered  -IV hydration  -Stool lactoferrin, cultures, O&P pending    Abdominal distention  Assessment & Plan  In setting of intractable nausea, vomiting, diarrhea and imaging concerning for ileus versus bowel obstruction.  -NG tube to suction  -Surgery consult    Acute renal failure (HCC)- (present on admission)  Assessment & Plan  Improving  Likely prerenal  -IV fluid hydration with normal saline  -Monitor BMP and assess response  -Avoid IV  contrast/nephrotoxins/NSAIDs  -Dose adjust meds for decreased GFR  -Urine electrolytes  -Renal ultrasound      Hypertension  Assessment & Plan  Hold valsartan in setting of renal failure  Continue propanolol    Lactic acidosis  Assessment & Plan  RESOLVED  Continue IV hydration    Hyperglycemia  Assessment & Plan   A1c 5.2         VTE prophylaxis: SCDs/TEDs    I have performed a physical exam and reviewed and updated ROS and Plan today (3/3/2023). In review of yesterday's note (3/2/2023), there are no changes except as documented above.

## 2023-03-03 NOTE — PROGRESS NOTES
4 Eyes Skin Assessment Completed by Mary De Jesus, RODO and Sharri Rivera, RODO.    Head WDL  Ears WDL  Nose WDL  Mouth WDL  Neck WDL  Breast/Chest WDL  Shoulder Blades WDL  Spine WDL  (R) Arm/Elbow/Hand Scar  (L) Arm/Elbow/Hand WDL  Abdomen WDL  Groin WDL  Scrotum/Coccyx/Buttocks WDL  (R) Leg Scar  (L) Leg WDL  (R) Heel/Foot/Toe WDL  (L) Heel/Foot/Toe Discoloration Left big toe nail black, previously removed per pt          Devices In Places OG/NG      Interventions In Place N/A    Possible Skin Injury No    Pictures Uploaded Into Epic N/A  Wound Consult Placed N/A  RN Wound Prevention Protocol Ordered No

## 2023-03-03 NOTE — ED NOTES
Pt resting comfortably. Provided ice chips, pt tolerating. ERP at bedside. Visitor at bedside. VSS on RA.

## 2023-03-03 NOTE — ASSESSMENT & PLAN NOTE
Improving  Likely prerenal  -IV fluid hydration with normal saline  -Monitor BMP and assess response  -Avoid IV contrast/nephrotoxins/NSAIDs  -Dose adjust meds for decreased GFR  -Urine electrolytes  -Renal ultrasound

## 2023-03-03 NOTE — ASSESSMENT & PLAN NOTE
Improving  In setting of intractable nausea, vomiting, diarrhea but no bowel obstruction  -Monitor

## 2023-03-03 NOTE — CONSULTS
DATE OF CONSULTATION:  3/3/2023     REFERRING PHYSICIAN:   Pooja Wall M.D.     CONSULTING PHYSICIAN:  Harry Merritt M.D.     REASON FOR CONSULTATION:  I have been asked by  to see the patient in surgical consultation for evaluation of possible small bowel obstruction.    HISTORY OF PRESENT ILLNESS: The patient is a 72 year-old White man who admitted to the [facility] medicine service yesterday with a one - day history of crampy generalized abdominal pain. The pain is associated with nausea, vomiting, and diarrhea. He  The patient has undergone appendectomy and umbilical hernia repair. The patient denies any previous surgery for obstructive symptoms.    PAST MEDICAL HISTORY:  has no past medical history on file.    PAST SURGICAL HISTORY: appendectomy, umbilical hernia repair    ALLERGIES:   Allergies   Allergen Reactions    Hydrochlorothiazide Rash          Penicillins Rash     Rash all over body       CURRENT MEDICATIONS:    Home Medications       Reviewed by Deniz Sam (Pharmacy Tech) on 03/02/23 at 1857  Med List Status: Complete     Medication Last Dose Status   ascorbic acid (VITAMIN C/NATURAL BABE HIPS) 1000 MG tablet 3/1/2023 Active   aspirin EC (ECOTRIN) 81 MG Tablet Delayed Response 3/1/2023 Active   Barberry-Oreg Grape-Goldenseal (BERBERINE COMPLEX PO) 3/2/2023 Active   Cholecalciferol (VITAMIN D-3) 125 MCG (5000 UT) Tab 3/1/2023 Active   Coenzyme Q10 200 MG Cap 3/1/2023 Active   gabapentin (NEURONTIN) 300 MG Cap > 2 WEEKS Active   Garlic 1000 MG Cap 3/1/2023 Active   hydroxychloroquine (PLAQUENIL) 200 MG Tab 3/2/2023 Active   ibuprofen (MOTRIN) 200 MG Tab 3/2/2023 Active   Levocetirizine Dihydrochloride 5 MG Tab 3/1/2023 Active   levothyroxine (SYNTHROID) 175 MCG Tab 3/2/2023 Active   METAMUCIL FIBER PO 3/2/2023 Active   Multiple Vitamins-Minerals (ONE-A-DAY MENS 50+) Tab 3/1/2023 Active   Omega-3 Fatty Acids (FISH OIL) 1000 MG Cap capsule 3/2/2023 Active   propranolol (INDERAL) 10 MG Tab  "3/2/2023 Active   rosuvastatin (CRESTOR) 5 MG Tab 3/1/2023 Active   tamsulosin (FLOMAX) 0.4 MG capsule 3/2/2023 Active   traZODone (DESYREL) 50 MG Tab 3/1/2023 Active   valsartan (DIOVAN) 160 MG Tab 3/2/2023 Active                    FAMILY HISTORY: family history is not on file.    SOCIAL HISTORY:  reports that he has quit smoking. His smoking use included cigarettes. He has never used smokeless tobacco. He reports that he does not currently use alcohol. He reports that he does not use drugs.    REVIEW OF SYSTEMS: Comprehensive review of systems is negative with the exception of the aforementioned HPI, PMH, and PSH bullets in accordance with CMS guidelines.    PHYSICAL EXAMINATION:      Constitutional:     Vital Signs: /75   Pulse 89   Temp 36.5 °C (97.7 °F) (Temporal)   Resp 18   Ht 1.727 m (5' 8\")   Wt 93 kg (205 lb)   SpO2 90%    General Appearance: alert in no acute distress.   HEENT:    Demonstrates symmetric, reactive pupils. Extraocular muscles   are intact. Nares and oropharynx are clear.   Neck:    Supple. No adenopathy.  Respiratory:   Inspection: Unlabored respirations, no intercostal retractions, paradoxical motion, or accessory muscle use.   Auscultation: normal.  Cardiovascular:   Inspection: The skin is warm, dry, and well purfused.  Auscultation: Regular rate and rhythm.   Peripheral Pulses: Normal.   Abdomen:  Inspection: Abdominal inspection reveals no abrasions, contusions, lacerations or penetrating wounds.   Palpation: Palpation is remarkable for no significant tenderness, guarding, or peritoneal findings. No abdominal wall hernias.  Extremities:   Examination of the upper and lower extremities demonstrates no cyanosis edema or clubbing.  Neurologic:   Alert & oriented to person, time and place. Normal motor function. Normal sensory function. No focal deficits noted.  Psychiatric:   oriented to time, place, person.    LABORATORY VALUES:   Recent Labs     03/02/23  1600 " 03/03/23  0519   WBC 9.9 5.3   RBC 5.95 5.17   HEMOGLOBIN 17.0 14.9   HEMATOCRIT 52.8* 45.7   MCV 88.7 88.4   MCH 28.6 28.8   MCHC 32.2* 32.6*   RDW 47.3 46.8   PLATELETCT 151* 117*   MPV 13.0* 13.1*     Recent Labs     03/02/23  1600 03/03/23  0519   SODIUM 145 142   POTASSIUM 4.5 3.9   CHLORIDE 109 107   CO2 22 22   GLUCOSE 137* 110*   BUN 28* 34*   CREATININE 2.51* 2.28*   CALCIUM 9.2 8.3*     Recent Labs     03/02/23  1600 03/03/23  0519   ASTSGOT 21 24   ALTSGPT 29 30   TBILIRUBIN 1.0 0.6   ALKPHOSPHAT 43 34   GLOBULIN 2.7 2.4   INR 1.20*  --      Recent Labs     03/02/23  1600   INR 1.20*        IMAGING:   DX-ABDOMEN FOR TUBE PLACEMENT   Final Result      NG tube extends into the fundus of the stomach.      CT-ABDOMEN-PELVIS W/O   Final Result         1.  Fluid-filled reactive upper abdominal small bowel loops, appearance suggests component of ileus and/or enteritis.   2.  Trace pericardial effusion   3.  Atherosclerosis and atherosclerotic coronary artery disease      DX-ABDOMEN FOR TUBE PLACEMENT   Final Result         1.  Nasogastric tube with side-port at the gastroesophageal junction, position essentially unchanged.   2.  Retrocardiac opacity favoring atelectasis or infiltrate, similar to prior study.   3.  Cardiomegaly      DX-ABDOMEN FOR TUBE PLACEMENT   Final Result         1.  Nasogastric tube with side-port at the gastroesophageal junction, recommend advancement.   2.  Retrocardiac opacity favoring atelectasis or infiltrate, similar to prior study.   3.  Cardiomegaly      DX-ABDOMEN FOR TUBE PLACEMENT   Final Result         1.  Nasogastric tube terminates at the gastroesophageal junction, recommend advancement.   2.  Retrocardiac opacity favoring atelectasis or infiltrate.   3.  Cardiomegaly      [id number] VIEW   Final Result      1.  There are some dilated air-filled loops of small bowel centrally in the abdomen. Findings could be due to partial or intermittent mechanical obstruction versus  ileus.      2.  Possible small bowel wall thickening noted in the right lower quadrant.                      ASSESSMENT AND PLAN:     72 year-old man with history of appendectomy and umbilical hernia repair presenting with abdominal distention, nausea, vomiting, and diarrhea. Imaging with dilated bowel concerning for partial bowel obstruction.      The patient has clinical and radiographic findings of partial small bowel obstruction without generalized peritonitis, evidence of clinical deterioration, or radiographic findings consistent with bowel ischemia. Nonoperative management, nasogastric decompression, serial abdominal examination, and water-soluble contrast imaging within 48 hours if obstructive symptoms fail to resolve.          ____________________________________     Harry Merritt M.D.    DD: 3/3/2023  11:36 AM    ACS NSQIP Surgical Risk Calculator

## 2023-03-03 NOTE — PROGRESS NOTES
"[facility] MEDICINE HISTORY AND PHYSICAL     PATIENT ID:  NAME:  Warner Cook  MRN:               [id number]  YOB: 1950    Date of Admission: 3/2/2023     Attending: Dr. Lackey    Resident: Dr. Wall    CC:  intractable n/v/d    HPI: Warner Cook is a 72 y.o. male who presented 3/2 with a 1-day hx of intractable n/v/d, with PMH of ventral hernia repair 2ya, HTN, HLD, hypothyroidism, and Sjogren's syndrome. On 2/28, pt and his wife traveled from home in [place] to visit family in [place]. Early 3/1, the patient ate at the [employer]. A few hours later that same day, he began having 6+ episodes of nonbilious nonbloody emesis and a similar quantity in watery diarrhea. On the morning of 3/2, he felt dizzy and had a near-syncopal episode in which he felt like he was \"about to black out\" yet did not lose consciousness. This prompted him to present to ED. Upon admission, he was found to be hypotensive and tachycardic though his initial EKG noted NSR. He was given fluids that shortly stabilized him. He was given an NG tube and fluids have been continuously given. He reports periumbilical pain, 3 episodes of watery diarrhea last night, generalized weakness, and a trace nonproductive cough, though he has had no episodes of n/v since admission, nor dizziness, LOC, CP, dyspnea, or any other symptoms.    REVIEW OF SYSTEMS:   Ten systems reviewed and were negative except as noted in the HPI.                PAST MEDICAL HISTORY:  Ventral hernia repair 2ya, hypothyroidism, Sjogren's, HTN, HLD    FAMILY HISTORY:  History reviewed. No pertinent family history.    SOCIAL HISTORY:   Pt lives in [place]  Smoking 60 pack years, quit 8 years ago  Etoh use socially  Drug use no    DIET:   Orders Placed This Encounter   Procedures    Diet NPO Restrict to: Sips with Medications     Standing Status:   Standing     Number of Occurrences:   1     Order Specific Question:   Diet NPO Restrict to:     Answer:   " Sips with Medications [3]       ALLERGIES:  Allergies   Allergen Reactions    Hydrochlorothiazide Rash          Penicillins Rash     Rash all over body       OUTPATIENT MEDICATIONS:    Current Facility-Administered Medications:     acetaminophen (Tylenol) tablet 650 mg, 650 mg, Enteral Tube, Q6HRS PRN, Sarah May M.D.    [Held by provider] aspirin (ASA) chewable tab 81 mg, 81 mg, Enteral Tube, DAILY, Sarah May M.D.    propranolol (INDERAL) tablet 10 mg, 10 mg, Enteral Tube, BID, Sarah May M.D.    rosuvastatin (CRESTOR) tablet 5 mg, 5 mg, Enteral Tube, Q EVENING, Sarah May M.D.    traZODone (DESYREL) tablet 50 mg, 50 mg, Enteral Tube, Nightly, Sarah May M.D.    levothyroxine (SYNTHROID) tablet 175 mcg, 175 mcg, Enteral Tube, AM ES, Sarah May M.D.    ondansetron (ZOFRAN ODT) dispertab 4 mg, 4 mg, Enteral Tube, Q4HRS PRN, Sarah May M.D.    lactated ringers infusion, , Intravenous, Continuous, TGaudencio Lackey M.D., Last Rate: 175 mL/hr at 23, Rate Change at 23    ondansetron (ZOFRAN) syringe/vial injection 4 mg, 4 mg, Intravenous, Q4HRS PRN, Sarah May M.D., 4 mg at 23    Pharmacy Consult: Enteral tube insertion - review meds/change route/product selection, , Other, PHARMACY TO DOSE, Sarah May M.D.    PHYSICAL EXAM:  Vitals:    23 0734 23 0835 23 0915 23 1202   BP: (!) 83/60 96/57 106/62 121/75   Pulse: 78   89   Resp: 18   18   Temp: 36.5 °C (97.7 °F)   36.5 °C (97.7 °F)   TempSrc: Temporal   Temporal   SpO2: 92%   90%   Weight:       Height:       , Temp (24hrs), Av.6 °C (97.8 °F), Min:36.3 °C (97.4 °F), Max:37.1 °C (98.7 °F)  , Pulse Oximetry: 90 %, O2 (LPM): 0, O2 Delivery Device: None - Room Air    General: Pt resting in NAD, cooperative   Skin:  Pink, warm and dry.  No rashes  HEENT: NC/AT. PERRL. EOMI. MMM. No nasal discharge. Oropharynx nonerythematous without exudate/plaques  Neck:  Supple without lymphadenopathy  "or rigidity. No JVD   Lungs:  Symmetrical.  CTAB with no W/R/R.  Good air movement   Cardiovascular:  S1/S2 RRR without M/R/G.  Abdomen:  Periumbilical abd tenderness and guarding, mild distension, and hyperresonant bowel sounds noted.   Genitourinary:  Normal male genitalia.    Extremities:  Full range of motion. No gross deformities noted. 2+ pulses in all extremities. No C/C/E   Spine:  Straight without vertebral anomalies.  CNS:  Muscle tone is normal. Cranial nerves II-XII grossly intact. 2+ DTRs.         LAB TESTS:   Recent Labs     03/02/23  1600 03/03/23  0519   WBC 9.9 5.3   RBC 5.95 5.17   HEMOGLOBIN 17.0 14.9   HEMATOCRIT 52.8* 45.7   MCV 88.7 88.4   MCH 28.6 28.8   RDW 47.3 46.8   PLATELETCT 151* 117*   MPV 13.0* 13.1*   NEUTSPOLYS 86.10* 62.10   LYMPHOCYTES 2.80* 15.60*   MONOCYTES 9.80 17.70*   EOSINOPHILS 0.60 4.00   BASOPHILS 0.20 0.20         Recent Labs     03/02/23  1600 03/03/23  0519   SODIUM 145 142   POTASSIUM 4.5 3.9   CHLORIDE 109 107   CO2 22 22   BUN 28* 34*   CREATININE 2.51* 2.28*   CALCIUM 9.2 8.3*   MAGNESIUM 1.9  --    ALBUMIN 4.4 3.6       CULTURES:   Results       Procedure Component Value Units Date/Time    CULTURE STOOL [[id number]]     Order Status: No result Specimen: Stool     Complete O&P [[id number]]     Order Status: No result Specimen: Stool     BLOOD CULTURE x2 [[id number]] Collected: 03/02/23 1701    Order Status: Completed Specimen: Blood from Peripheral Updated: 03/03/23 0726     Significant Indicator NEG     Source BLD     Site PERIPHERAL     Culture Result No Growth  Note: Blood cultures are incubated for 5 days and  are monitored continuously.Positive blood cultures  are called to the RN and reported as soon as  they are identified.      Narrative:      Per Hospital Policy: Only change Specimen Src: to \"Line\" if  specified by physician order.  Left Hand    BLOOD CULTURE x2 [[id number]] Collected: 03/02/23 1644    Order Status: Completed Specimen: Blood from Peripheral " "Updated: 03/03/23 0726     Significant Indicator NEG     Source BLD     Site PERIPHERAL     Culture Result No Growth  Note: Blood cultures are incubated for 5 days and  are monitored continuously.Positive blood cultures  are called to the RN and reported as soon as  they are identified.      Narrative:      Per Hospital Policy: Only change Specimen Src: to \"Line\" if  specified by physician order.  No site indicated    URINALYSIS [[id number]]  (Abnormal) Collected: 03/02/23 2340    Order Status: Completed Specimen: Urine Updated: 03/03/23 0048     Color Yellow     Character Clear     Specific Gravity >=1.030     Ph 5.5     Glucose Negative mg/dL      Ketones Trace mg/dL      Protein 30 mg/dL      Bilirubin Small     Urobilinogen, Urine 0.2     Nitrite Negative     Leukocyte Esterase Negative     Occult Blood Negative     Micro Urine Req Microscopic            IMAGES:  CT abdomen noted fluid-filled small bowel loops consistent with either enteritis vs ileus. Abd XR noted dilated air-filled small bowel. Abd XR for NG tube also noted a retrocardiac opacity and cardiomegaly. EKG revealed possible RVH, though otherwise NSR.      ASSESSMENT/PLAN: 72 y.o. male admitted for 1-day hx of intractable n/v/d.  Viral gastroenteritis- symptoms possibly attributable to food poisoning from meal early 3/1 vs SBO vs unrelated ileus. Ileus possibly a result of enteritis, due to motility-halting effects of various bacterial toxins. Await surgery consult, and in meantime maintain NPO status, IV fluids, NG tube.  LOUIE- continue fluids, repeat BMP, and avoid nephrotoxins (e.g. contrast or NSAIDs).  Comorbidities- continue at-home medications  "

## 2023-03-03 NOTE — H&P
Hospital Medicine History & Physical Note    Date of Service  3/2/2023    Primary Care Physician  No primary care provider on file.    Consultants      Specialist Names:     Code Status  Full Code    Chief Complaint  Chief Complaint   Patient presents with    Nausea/Vomiting/Diarrhea     BIBA for vomiting and diarrhea since last night. Pt reports several episodes of near syncope. BP 72/52 for medics, increased to 92/52 with 250ml fluids.Currently 77/49. Denies dizziness while laying in bed. Recent travel to [place] on Tuesday.       History of Presenting Illness  Warner Byrne is a 72 y.o. male who presented 3/2/2023 with pros medical history of hypertension, hyperlipidemia, history of ventral hernia repair, Sjogren syndrome who comes into the hospital for nausea, vomiting, diarrhea that started yesterday.  Patient states that he been having more than 6 or 7 episodes of vomiting since last night.  He also complains of multiple episodes of watery diarrhea.  He denies any hematemesis or bloody stools.  Today he suddenly felt lightheaded and almost passed out that prompted him to come to the ER.  Patient does take ibuprofen at home.  Patient states that 2 weeks ago he was started on hydroxychloroquine.  His Flomax was doubled recently as well.    Patient arrived to the hospital hypotensive and tachycardic  EKG found normal sinus rhythm      I discussed the plan of care with patient.    Review of Systems  Review of Systems   Constitutional:  Negative for chills, diaphoresis, fever and malaise/fatigue.   HENT:  Negative for congestion, ear discharge, ear pain, hearing loss, nosebleeds, sinus pain, sore throat and tinnitus.    Eyes:  Negative for blurred vision, double vision, photophobia and pain.   Respiratory:  Negative for cough, hemoptysis, sputum production, shortness of breath, wheezing and stridor.    Cardiovascular:  Negative for chest pain, palpitations, orthopnea, claudication, leg swelling and PND.    Gastrointestinal:  Positive for abdominal pain, nausea and vomiting. Negative for blood in stool, constipation, diarrhea, heartburn and melena.   Genitourinary:  Negative for dysuria, flank pain, frequency, hematuria and urgency.   Musculoskeletal:  Negative for back pain, falls, joint pain, myalgias and neck pain.   Skin:  Negative for itching and rash.   Neurological:  Positive for dizziness and weakness. Negative for tingling, tremors and headaches.   Endo/Heme/Allergies:  Negative for environmental allergies and polydipsia. Does not bruise/bleed easily.   Psychiatric/Behavioral:  Negative for depression, hallucinations, substance abuse and suicidal ideas.      Past Medical History  Medical history is reviewed and nonpertinent    Surgical History  Surgical history reviewed and not pertinent    Family History  family history is not on file.   Family history reviewed with patient. There is no family history that is pertinent to the chief complaint.     Social History   reports that he has quit smoking. His smoking use included cigarettes. He has never used smokeless tobacco. He reports that he does not currently use alcohol. He reports that he does not use drugs.    Allergies  Allergies   Allergen Reactions    Hydrochlorothiazide Rash          Penicillins Rash     Rash all over body       Medications  Prior to Admission Medications   Prescriptions Last Dose Informant Patient Reported? Taking?   Barberry-Oreg Grape-Goldenseal (BERBERINE COMPLEX PO) 3/2/2023 at 0900  Yes Yes   Sig: Take 1 Tablet by mouth 2 times a day.   Cholecalciferol (VITAMIN D-3) 125 MCG (5000 UT) Tab 3/1/2023 at PM  Yes Yes   Sig: Take 5,000 Units by mouth every day.   Coenzyme Q10 200 MG Cap 3/1/2023 at PM  Yes Yes   Sig: Take 200 mg by mouth every day.   Garlic 1000 MG Cap 3/1/2023 at PM  Yes Yes   Sig: Take 1,000 mg by mouth every day.   Levocetirizine Dihydrochloride 5 MG Tab 3/1/2023 at PM  Yes Yes   Sig: Take 5 mg by mouth every day.    METAMUCIL FIBER PO 3/2/2023 at 0900  Yes Yes   Sig: Take 2 Capsules by mouth 2 times a day.   Multiple Vitamins-Minerals (ONE-A-DAY MENS 50+) Tab 3/1/2023 at PM  Yes Yes   Sig: Take 1 Tablet by mouth every day.   Omega-3 Fatty Acids (FISH OIL) 1000 MG Cap capsule 3/2/2023 at 0900  Yes Yes   Sig: Take 1,000 mg by mouth 2 times a day.   ascorbic acid (VITAMIN C/NATURAL ABBE HIPS) 1000 MG tablet 3/1/2023 at PM  Yes Yes   Sig: Take 1,000 mg by mouth every day.   aspirin EC (ECOTRIN) 81 MG Tablet Delayed Response 3/1/2023 at PM  Yes Yes   Sig: Take 81 mg by mouth every day.   gabapentin (NEURONTIN) 300 MG Cap > 2 WEEKS at PRN  Yes Yes   Sig: Take 300 mg by mouth 3 times a day as needed.   hydroxychloroquine (PLAQUENIL) 200 MG Tab 3/2/2023 at 0900  Yes Yes   Sig: Take 200 mg by mouth 2 times a day.   ibuprofen (MOTRIN) 200 MG Tab 3/2/2023 at 0900  Yes Yes   Sig: Take 200-600 mg by mouth every 6 hours as needed for Mild Pain.   levothyroxine (SYNTHROID) 175 MCG Tab 3/2/2023 at 0600  Yes Yes   Sig: Take 175 mcg by mouth every morning on an empty stomach.   propranolol (INDERAL) 10 MG Tab 3/2/2023 at 0900  Yes Yes   Sig: Take 10 mg by mouth 2 times a day.   rosuvastatin (CRESTOR) 5 MG Tab 3/1/2023 at PM  Yes Yes   Sig: Take 5 mg by mouth every evening.   tamsulosin (FLOMAX) 0.4 MG capsule 3/2/2023 at 0900  Yes Yes   Sig: Take 0.4 mg by mouth 2 times a day.   traZODone (DESYREL) 50 MG Tab 3/1/2023 at PM  Yes Yes   Sig: Take 50 mg by mouth every evening.   valsartan (DIOVAN) 160 MG Tab 3/2/2023 at 0900  Yes Yes   Sig: Take 160 mg by mouth every day.      Facility-Administered Medications: None       Physical Exam  Temp:  [36.5 °C (97.7 °F)-37.1 °C (98.7 °F)] 37.1 °C (98.7 °F)  Pulse:  [78-98] 94  Resp:  [13-20] 16  BP: ()/(49-67) 106/67  SpO2:  [92 %-96 %] 94 %  Blood Pressure : 100/61   Temperature: 36.5 °C (97.7 °F)   Pulse: 98   Respiration: 14   Pulse Oximetry: 95 %       Physical Exam  Vitals and nursing note  reviewed.   Constitutional:       General: He is not in acute distress.     Appearance: Normal appearance. He is not ill-appearing, toxic-appearing or diaphoretic.   HENT:      Head: Normocephalic and atraumatic.      Nose: No congestion or rhinorrhea.      Mouth/Throat:      Pharynx: No posterior oropharyngeal erythema.   Eyes:      General: No scleral icterus.        Right eye: No discharge.   Cardiovascular:      Rate and Rhythm: Normal rate and regular rhythm.      Pulses: Normal pulses.      Heart sounds: Normal heart sounds. No murmur heard.    No friction rub. No gallop.   Pulmonary:      Effort: Pulmonary effort is normal. No respiratory distress.      Breath sounds: Normal breath sounds. No stridor. No wheezing, rhonchi or rales.   Abdominal:      General: There is distension.      Tenderness: There is abdominal tenderness.      Comments: Hypoactive bowel sounds   Musculoskeletal:         General: No swelling, tenderness, deformity or signs of injury.      Cervical back: Normal range of motion.      Right lower leg: No edema.      Left lower leg: No edema.   Skin:     Capillary Refill: Capillary refill takes more than 3 seconds.      Coloration: Skin is not jaundiced or pale.      Findings: No bruising, erythema, lesion or rash.   Neurological:      General: No focal deficit present.      Mental Status: He is alert and oriented to person, place, and time.       Laboratory:  Recent Labs     03/02/23  1600   WBC 9.9   RBC 5.95   HEMOGLOBIN 17.0   HEMATOCRIT 52.8*   MCV 88.7   MCH 28.6   MCHC 32.2*   RDW 47.3   PLATELETCT 151*   MPV 13.0*     Recent Labs     03/02/23  1600   SODIUM 145   POTASSIUM 4.5   CHLORIDE 109   CO2 22   GLUCOSE 137*   BUN 28*   CREATININE 2.51*   CALCIUM 9.2     Recent Labs     03/02/23  1600   ALTSGPT 29   ASTSGOT 21   ALKPHOSPHAT 43   TBILIRUBIN 1.0   LIPASE 24   GLUCOSE 137*     Recent Labs     03/02/23  1600   INR 1.20*     No results for input(s): NTPROBNP in the last 72 hours.       Recent Labs     03/02/23  1600   TROPONINT 10       Imaging:  [id number]   Final Result      1.  There are some dilated air-filled loops of small bowel centrally in the abdomen. Findings could be due to partial or intermittent mechanical obstruction versus ileus.      2.  Possible small bowel wall thickening noted in the right lower quadrant.                  US-RENAL    (Results Pending)   CT-ABDOMEN-PELVIS WITH    (Results Pending)       EKG:  I have personally reviewed the images and compared with prior images.    Assessment/Plan:  Justification for Admission Status  I anticipate this patient will require at least two midnights for appropriate medical management, necessitating inpatient admission because acute renal failure    Patient will need a Med/Surg bed on MEDICAL service .  The need is secondary to acute renal failure.    * Ileus (HCC)  Assessment & Plan  Serial abdominal exams and x-rays  IV hydration  NG tube  CT scan of the abdomen  Consult surgery in a.m.    Hypertension  Assessment & Plan  Hold valsartan in setting of renal failure  Continue propanolol    Lactic acidosis  Assessment & Plan  Continue IV hydration    Hyperglycemia  Assessment & Plan  Check hemoglobin A1c    Intractable nausea and vomiting  Assessment & Plan  Nausea medications ordered  IV hydration    Abdominal distention  Assessment & Plan  I have ordered an abdominal x-ray and bladder scans    Acute renal failure (HCC)- (present on admission)  Assessment & Plan  Likely prerenal  IV fluid hydration with normal saline  Monitor BMP and assess response  Avoid IV contrast/nephrotoxins/NSAIDs  Dose adjust meds for decreased GFR  Urine electrolytes  Renal ultrasound          VTE prophylaxis: SCDs/TEDs

## 2023-03-03 NOTE — CARE PLAN
The patient is Watcher - Medium risk of patient condition declining or worsening    Shift Goals  Clinical Goals: Abdominal decompression, Anti-emetics  Patient Goals: Reduce nausea    Progress made toward(s) clinical / shift goals:    Problem: Knowledge Deficit - Standard  Goal: Patient and family/care givers will demonstrate understanding of plan of care, disease process/condition, diagnostic tests and medications  Outcome: Progressing  Note: Pt understands POC for diagnostic tests and NG tube placement. Denies any further questions.     Problem: Hemodynamics  Goal: Patient's hemodynamics, fluid balance and neurologic status will be stable or improve  Outcome: Progressing  Note: Pt's hemodynamics remain stable, on RA.      Problem: Nutrition  Goal: Patient's nutritional and fluid intake will be adequate or improve  Outcome: Progressing  Note: Pt NPO but receiving IV fluids - LR @ 125mL/hr

## 2023-03-03 NOTE — ED NOTES
Med Rec complete per patient  No oral antibiotics in the last 30 days per patient  Allergies reviewed  Preferred Pharmacy: [facility]

## 2023-03-04 ENCOUNTER — APPOINTMENT (OUTPATIENT)
Dept: RADIOLOGY | Facility: MEDICAL CENTER | Age: 73
DRG: 683 | End: 2023-03-04
Payer: MEDICARE

## 2023-03-04 ENCOUNTER — APPOINTMENT (OUTPATIENT)
Dept: RADIOLOGY | Facility: MEDICAL CENTER | Age: 73
DRG: 683 | End: 2023-03-04
Attending: SURGERY
Payer: MEDICARE

## 2023-03-04 PROBLEM — E87.6 HYPOKALEMIA: Status: ACTIVE | Noted: 2023-03-04

## 2023-03-04 LAB
ANION GAP SERPL CALC-SCNC: 9 MMOL/L (ref 7–16)
BUN SERPL-MCNC: 22 MG/DL (ref 8–22)
CALCIUM SERPL-MCNC: 8.5 MG/DL (ref 8.5–10.5)
CHLORIDE SERPL-SCNC: 108 MMOL/L (ref 96–112)
CO2 SERPL-SCNC: 28 MMOL/L (ref 20–33)
CREAT SERPL-MCNC: 1.26 MG/DL (ref 0.5–1.4)
ERYTHROCYTE [DISTWIDTH] IN BLOOD BY AUTOMATED COUNT: 45.1 FL (ref 35.9–50)
GFR SERPLBLD CREATININE-BSD FMLA CKD-EPI: 61 ML/MIN/1.73 M 2
GLUCOSE SERPL-MCNC: 98 MG/DL (ref 65–99)
HCT VFR BLD AUTO: 43 % (ref 42–52)
HGB BLD-MCNC: 14.2 G/DL (ref 14–18)
MCH RBC QN AUTO: 28.7 PG (ref 27–33)
MCHC RBC AUTO-ENTMCNC: 33 G/DL (ref 33.7–35.3)
MCV RBC AUTO: 86.9 FL (ref 81.4–97.8)
PLATELET # BLD AUTO: 124 K/UL (ref 164–446)
PMV BLD AUTO: 12.2 FL (ref 9–12.9)
POTASSIUM SERPL-SCNC: 3.5 MMOL/L (ref 3.6–5.5)
RBC # BLD AUTO: 4.95 M/UL (ref 4.7–6.1)
SODIUM SERPL-SCNC: 145 MMOL/L (ref 135–145)
WBC # BLD AUTO: 6.7 K/UL (ref 4.8–10.8)

## 2023-03-04 PROCEDURE — 700111 HCHG RX REV CODE 636 W/ 250 OVERRIDE (IP)

## 2023-03-04 PROCEDURE — A9270 NON-COVERED ITEM OR SERVICE: HCPCS | Performed by: HOSPITALIST

## 2023-03-04 PROCEDURE — 85027 COMPLETE CBC AUTOMATED: CPT

## 2023-03-04 PROCEDURE — 700102 HCHG RX REV CODE 250 W/ 637 OVERRIDE(OP): Performed by: HOSPITALIST

## 2023-03-04 PROCEDURE — 80048 BASIC METABOLIC PNL TOTAL CA: CPT

## 2023-03-04 PROCEDURE — 74018 RADEX ABDOMEN 1 VIEW: CPT

## 2023-03-04 PROCEDURE — 770001 HCHG ROOM/CARE - MED/SURG/GYN PRIV*

## 2023-03-04 PROCEDURE — 700117 HCHG RX CONTRAST REV CODE 255: Performed by: SURGERY

## 2023-03-04 PROCEDURE — 99233 SBSQ HOSP IP/OBS HIGH 50: CPT | Performed by: NURSE PRACTITIONER

## 2023-03-04 PROCEDURE — 99232 SBSQ HOSP IP/OBS MODERATE 35: CPT | Mod: GC | Performed by: HOSPITALIST

## 2023-03-04 PROCEDURE — 700105 HCHG RX REV CODE 258: Performed by: HOSPITALIST

## 2023-03-04 PROCEDURE — 36415 COLL VENOUS BLD VENIPUNCTURE: CPT

## 2023-03-04 PROCEDURE — 74250 X-RAY XM SM INT 1CNTRST STD: CPT

## 2023-03-04 RX ORDER — POTASSIUM CHLORIDE 7.45 MG/ML
10 INJECTION INTRAVENOUS ONCE
Status: COMPLETED | OUTPATIENT
Start: 2023-03-04 | End: 2023-03-04

## 2023-03-04 RX ORDER — HYDROCORTISONE 25 MG/G
CREAM TOPICAL
Status: DISCONTINUED | OUTPATIENT
Start: 2023-03-04 | End: 2023-03-05 | Stop reason: HOSPADM

## 2023-03-04 RX ORDER — POTASSIUM CHLORIDE 7.45 MG/ML
10 INJECTION INTRAVENOUS
Status: COMPLETED | OUTPATIENT
Start: 2023-03-04 | End: 2023-03-04

## 2023-03-04 RX ADMIN — PROPRANOLOL HYDROCHLORIDE 10 MG: 10 TABLET ORAL at 04:28

## 2023-03-04 RX ADMIN — SODIUM CHLORIDE, POTASSIUM CHLORIDE, SODIUM LACTATE AND CALCIUM CHLORIDE: 600; 310; 30; 20 INJECTION, SOLUTION INTRAVENOUS at 03:12

## 2023-03-04 RX ADMIN — POTASSIUM CHLORIDE 10 MEQ: 7.46 INJECTION, SOLUTION INTRAVENOUS at 13:43

## 2023-03-04 RX ADMIN — PROPRANOLOL HYDROCHLORIDE 10 MG: 10 TABLET ORAL at 16:58

## 2023-03-04 RX ADMIN — POTASSIUM CHLORIDE 10 MEQ: 7.46 INJECTION, SOLUTION INTRAVENOUS at 12:40

## 2023-03-04 RX ADMIN — LEVOTHYROXINE SODIUM 175 MCG: 0.17 TABLET ORAL at 04:28

## 2023-03-04 RX ADMIN — ROSUVASTATIN CALCIUM 5 MG: 5 TABLET, COATED ORAL at 16:58

## 2023-03-04 RX ADMIN — TRAZODONE HYDROCHLORIDE 50 MG: 100 TABLET ORAL at 20:29

## 2023-03-04 RX ADMIN — POTASSIUM CHLORIDE 10 MEQ: 7.46 INJECTION, SOLUTION INTRAVENOUS at 09:39

## 2023-03-04 RX ADMIN — IOHEXOL 200 ML: 300 INJECTION, SOLUTION INTRAVENOUS at 10:15

## 2023-03-04 ASSESSMENT — ENCOUNTER SYMPTOMS
BACK PAIN: 0
TINGLING: 0
FALLS: 0
SORE THROAT: 0
HEARTBURN: 0
HALLUCINATIONS: 0
HEMOPTYSIS: 0
DOUBLE VISION: 0
DIARRHEA: 0
TREMORS: 0
DIAPHORESIS: 0
DIZZINESS: 1
FEVER: 0
PND: 0
PALPITATIONS: 0
SHORTNESS OF BREATH: 0
CHILLS: 0
SINUS PAIN: 0
SPUTUM PRODUCTION: 0
COUGH: 0
PHOTOPHOBIA: 0
MYALGIAS: 0
HEADACHES: 0
BLURRED VISION: 0
WHEEZING: 0
WEAKNESS: 1
ORTHOPNEA: 0
STRIDOR: 0
DEPRESSION: 0
FLANK PAIN: 0
VOMITING: 0
POLYDIPSIA: 0
ABDOMINAL PAIN: 1
BLOOD IN STOOL: 0
CLAUDICATION: 0
NECK PAIN: 0
CONSTIPATION: 0
EYE PAIN: 0
BRUISES/BLEEDS EASILY: 0
NAUSEA: 0

## 2023-03-04 ASSESSMENT — PAIN DESCRIPTION - PAIN TYPE
TYPE: ACUTE PAIN
TYPE: ACUTE PAIN

## 2023-03-04 ASSESSMENT — PATIENT HEALTH QUESTIONNAIRE - PHQ9
2. FEELING DOWN, DEPRESSED, IRRITABLE, OR HOPELESS: NOT AT ALL
SUM OF ALL RESPONSES TO PHQ9 QUESTIONS 1 AND 2: 0
1. LITTLE INTEREST OR PLEASURE IN DOING THINGS: NOT AT ALL

## 2023-03-04 ASSESSMENT — FIBROSIS 4 INDEX: FIB4 SCORE: 2.47

## 2023-03-04 ASSESSMENT — LIFESTYLE VARIABLES: SUBSTANCE_ABUSE: 0

## 2023-03-04 NOTE — PROGRESS NOTES
[facility] [place] [facility] MEDICINE PROGRESS NOTE     Attending: Dr. Lackey    Resident: Dr. Wall    PATIENT: Warner Cook; [id number]; 1950    ID: 72 y.o. male admitted for 1-day hx of intractable n/v/d    SUBJECTIVE: No acute events overnight, received meds crushed through NG tube. Requested relief from anal irritation. Overall feeling bettter and reports no new symptoms. Pt notes that periumbilical abd pain has dissipated and he has had 2 episodes of semi-solid stools since yesterday.     OBJECTIVE:     Vitals:    03/03/23 2201 03/04/23 0519 03/04/23 0726 03/04/23 0937   BP: 125/76 (!) 141/80 119/66    Pulse: 66 63 64    Resp: 18 17 18    Temp: 37.1 °C (98.8 °F) 37.3 °C (99.1 °F) 36.8 °C (98.2 °F)    TempSrc: Temporal Temporal Temporal    SpO2: 92% 89% 93%    Weight:    94.3 kg (207 lb 14.3 oz)   Height:         No intake or output data in the 24 hours ending 03/04/23 1214    PE:  General: No acute distress, resting comfortably in bed.  HEENT: NC/AT. PERRLA. EOMI. MMM  Cardiovascular: RRR with no M/R/G.  Respiratory: Symmetrical chest. CTAB with no W/R/R  Abdomen: Periumbilical abd tenderness disappeared, pt less distended diffusely, hyperresonant bowel sounds in RLQ and LUQ diminished relative to yesterday. No guarding or rebound tenderness noted.  EXT:  GIPSON, %/5 strength, No C/C/E 2+ pulses   Neuro: non focal with no numbness, tingling or changes in sensation    LABS:  Recent Labs     03/02/23  1600 03/03/23  0519 03/04/23  0136   WBC 9.9 5.3 6.7   RBC 5.95 5.17 4.95   HEMOGLOBIN 17.0 14.9 14.2   HEMATOCRIT 52.8* 45.7 43.0   MCV 88.7 88.4 86.9   MCH 28.6 28.8 28.7   RDW 47.3 46.8 45.1   PLATELETCT 151* 117* 124*   MPV 13.0* 13.1* 12.2   NEUTSPOLYS 86.10* 62.10  --    LYMPHOCYTES 2.80* 15.60*  --    MONOCYTES 9.80 17.70*  --    EOSINOPHILS 0.60 4.00  --    BASOPHILS 0.20 0.20  --      Recent Labs     03/02/23  1600 03/03/23  0519 03/03/23  1727 03/04/23  0136   SODIUM 145 142 145 145   POTASSIUM 4.5 3.9 3.9 3.5*    CHLORIDE 109 107 109 108   CO2 22 22 25 28   BUN 28* 34* 24* 22   CREATININE 2.51* 2.28* 1.41* 1.26   CALCIUM 9.2 8.3* 8.5 8.5   MAGNESIUM 1.9  --  1.7  --    PHOSPHORUS  --   --  2.8  --    ALBUMIN 4.4 3.6  --   --      Estimated GFR/CRCL = Estimated Creatinine Clearance: 60.8 mL/min (by C-G formula based on SCr of 1.26 mg/dL).  Recent Labs     03/03/23  0519 03/03/23  1727 03/04/23  0136   GLUCOSE 110* 100* 98     Recent Labs     03/02/23  1600 03/03/23  0519   ASTSGOT 21 24   ALTSGPT 29 30   TBILIRUBIN 1.0 0.6   ALKPHOSPHAT 43 34   GLOBULIN 2.7 2.4   INR 1.20*  --              Recent Labs     03/02/23  1600   INR 1.20*       MICROBIOLOGY: awaiting stool cultures    IMAGING: Abd XR noted no evidence of SBO, and small bowel XR corroborated this.    MEDS:  Current Facility-Administered Medications   Medication Last Admin    hydrocortisone rectal (Perianal) 2.5% cream      acetaminophen (Tylenol) tablet 650 mg 650 mg at 03/03/23 1720    [Held by provider] aspirin (ASA) chewable tab 81 mg      propranolol (INDERAL) tablet 10 mg 10 mg at 03/04/23 0428    rosuvastatin (CRESTOR) tablet 5 mg 5 mg at 03/03/23 1713    traZODone (DESYREL) tablet 50 mg 50 mg at 03/03/23 2211    levothyroxine (SYNTHROID) tablet 175 mcg 175 mcg at 03/04/23 0428    ondansetron (ZOFRAN ODT) dispertab 4 mg      lactated ringers infusion Rate Change at 03/04/23 0838    ondansetron (ZOFRAN) syringe/vial injection 4 mg 4 mg at 03/02/23 2117    Pharmacy Consult: Enteral tube insertion - review meds/change route/product selection         PROBLEM LIST:  No problems updated.    ASSESSMENT/PLAN: 73yo M admitted for 1-day hx of intractable n/v/d    Ileus- likely due to viral gastroenteritis resulting from food poisoning, since no SBO evidence from small-bowel follow through, and pt ate suspicious meal at [employer] earlier that same day of symptom onset. Continue protocol cautiously with advancing diet to liquids initially to evalute how patient  tolerates, as well as continuing fluids and NG tube to suction only if not tolerating liquids. Moreover, repeat XR to evaluate resolution of dilation of small bowel loops, and await stool cultures for possible ova and parasites.  LOUIE- prerenal pattern has improved with fluids. Repeat BMP and avoid nephrotoxins to minimize risk of recurrence.  Comorbidities- continue at-home meds when possible

## 2023-03-04 NOTE — PROGRESS NOTES
[facility] Internal Medicine Daily Progress Note    Date of Service  3/4/2023    [facility] Team: R IM White Team   Attending: ABRAHAN Lackey M.d.  Senior Resident: Dr. Deutsch  Intern:  Dr. Wall  Contact Number: [phone]    Chief Complaint  Warner Cook is a 72 y.o. male admitted 3/2/2023 with intractable nausea, vomiting, and diarrhea for three days    Hospital Course  Warner Byrne is a 72 y.o. male with PMHx ventral hernia repair 2020, Sjogren syndrome who presented 3/2/2023  for severe one day of nausea, vomiting, watery diarrhea that started ~6 hours after eating [employer] without hematemesis or hematochezia but with associated dizziness. In the ED he was hypotensive so was given fluids with improvement, and due to concern for bowel obstruction vs. Ileus an NG tube was placed for decompression.     Interval Problem Update  Patient feeling much better today- he hasn't had nausea or vomiting, and his one bowel movement was semi-formed. He would like to try drinking fluids to see how he tolerates. Per surgery KARLOS Taveras and Dr. Merritt no evidence of SBO on small bowel follow through so will sign off. Blood pressure greatly improved after increasing IV fluid rate yesterday. Fecal lactoferrin positive, pending stool culture and O&P still. Magnesium repleted last night, potassium repleted this morning    I have discussed this patient's plan of care and discharge plan at IDT rounds today with Case Management, Nursing, Nursing leadership, and other members of the IDT team.    Consultants/Specialty  general surgery    Code Status  Full Code    Disposition  Patient is not medically cleared for discharge.   Anticipate discharge to to home with close outpatient follow-up.  I have placed the appropriate orders for post-discharge needs.    Review of Systems  Review of Systems   Constitutional:  Negative for chills, diaphoresis, fever and malaise/fatigue.   HENT:  Negative for congestion, ear discharge, ear pain,  hearing loss, nosebleeds, sinus pain, sore throat and tinnitus.    Eyes:  Negative for blurred vision, double vision, photophobia and pain.   Respiratory:  Negative for cough, hemoptysis, sputum production, shortness of breath, wheezing and stridor.    Cardiovascular:  Negative for chest pain, palpitations, orthopnea, claudication, leg swelling and PND.   Gastrointestinal:  Positive for abdominal pain (improved). Negative for blood in stool, constipation, diarrhea, heartburn, melena, nausea and vomiting.   Genitourinary:  Negative for dysuria, flank pain, frequency, hematuria and urgency.   Musculoskeletal:  Negative for back pain, falls, joint pain, myalgias and neck pain.   Skin:  Negative for itching and rash.   Neurological:  Positive for dizziness and weakness. Negative for tingling, tremors and headaches.   Endo/Heme/Allergies:  Negative for environmental allergies and polydipsia. Does not bruise/bleed easily.   Psychiatric/Behavioral:  Negative for depression, hallucinations, substance abuse and suicidal ideas.       Physical Exam  Temp:  [36.8 °C (98.2 °F)-37.3 °C (99.1 °F)] 36.8 °C (98.2 °F)  Pulse:  [63-85] 64  Resp:  [17-18] 18  BP: (119-141)/(66-80) 119/66  SpO2:  [89 %-93 %] 93 %    Physical Exam  Vitals and nursing note reviewed.   Constitutional:       General: He is not in acute distress.     Appearance: Normal appearance. He is not ill-appearing, toxic-appearing or diaphoretic.   HENT:      Head: Normocephalic and atraumatic.      Nose: No congestion or rhinorrhea.      Mouth/Throat:      Pharynx: No posterior oropharyngeal erythema.   Eyes:      General: No scleral icterus.        Right eye: No discharge.   Cardiovascular:      Rate and Rhythm: Normal rate and regular rhythm.      Pulses: Normal pulses.      Heart sounds: Normal heart sounds. No murmur heard.    No friction rub. No gallop.   Pulmonary:      Effort: Pulmonary effort is normal. No respiratory distress.      Breath sounds: Normal  breath sounds. No stridor. No wheezing, rhonchi or rales.   Abdominal:      General: There is distension.      Tenderness: There is abdominal tenderness (greatly improved). There is no guarding or rebound.      Comments: Bowel sounds normoactive without high pitch or tinkling   Musculoskeletal:         General: No swelling, tenderness, deformity or signs of injury.      Cervical back: Normal range of motion.      Right lower leg: No edema.      Left lower leg: No edema.   Skin:     Coloration: Skin is not jaundiced or pale.      Findings: No bruising, erythema, lesion or rash.   Neurological:      General: No focal deficit present.      Mental Status: He is alert and oriented to person, place, and time.   Psychiatric:         Mood and Affect: Mood normal.         Behavior: Behavior normal.       Fluids  No intake or output data in the 24 hours ending 03/04/23 1204      Laboratory  Recent Labs     03/02/23  1600 03/03/23  0519 03/04/23  0136   WBC 9.9 5.3 6.7   RBC 5.95 5.17 4.95   HEMOGLOBIN 17.0 14.9 14.2   HEMATOCRIT 52.8* 45.7 43.0   MCV 88.7 88.4 86.9   MCH 28.6 28.8 28.7   MCHC 32.2* 32.6* 33.0*   RDW 47.3 46.8 45.1   PLATELETCT 151* 117* 124*   MPV 13.0* 13.1* 12.2     Recent Labs     03/03/23  0519 03/03/23  1727 03/04/23  0136   SODIUM 142 145 145   POTASSIUM 3.9 3.9 3.5*   CHLORIDE 107 109 108   CO2 22 25 28   GLUCOSE 110* 100* 98   BUN 34* 24* 22   CREATININE 2.28* 1.41* 1.26   CALCIUM 8.3* 8.5 8.5     Recent Labs     03/02/23  1600   INR 1.20*               Imaging  DX-SMALL BOWEL SERIES   Final Result         No evidence of small bowel obstruction.      [id number] VIEW   Final Result         No specific finding to suggest small bowel obstruction.      DX-ABDOMEN FOR TUBE PLACEMENT   Final Result      NG tube extends into the fundus of the stomach.      CT-ABDOMEN-PELVIS W/O   Final Result         1.  Fluid-filled reactive upper abdominal small bowel loops, appearance suggests component of ileus and/or  enteritis.   2.  Trace pericardial effusion   3.  Atherosclerosis and atherosclerotic coronary artery disease      DX-ABDOMEN FOR TUBE PLACEMENT   Final Result         1.  Nasogastric tube with side-port at the gastroesophageal junction, position essentially unchanged.   2.  Retrocardiac opacity favoring atelectasis or infiltrate, similar to prior study.   3.  Cardiomegaly      DX-ABDOMEN FOR TUBE PLACEMENT   Final Result         1.  Nasogastric tube with side-port at the gastroesophageal junction, recommend advancement.   2.  Retrocardiac opacity favoring atelectasis or infiltrate, similar to prior study.   3.  Cardiomegaly      DX-ABDOMEN FOR TUBE PLACEMENT   Final Result         1.  Nasogastric tube terminates at the gastroesophageal junction, recommend advancement.   2.  Retrocardiac opacity favoring atelectasis or infiltrate.   3.  Cardiomegaly      [id number] VIEW   Final Result      1.  There are some dilated air-filled loops of small bowel centrally in the abdomen. Findings could be due to partial or intermittent mechanical obstruction versus ileus.      2.  Possible small bowel wall thickening noted in the right lower quadrant.                  EC-ECHOCARDIOGRAM COMPLETE W/O CONT    (Results Pending)         Assessment/Plan  Problem Representation: 73 YO M admitted 3/2/23 for intractable nausea, vomiting, diarrhea concerning for ileus versus small bowel obstruction    * Ileus (HCC)  Assessment & Plan  Ileus in setting of food poisoning or viral gastroenteritis, no evidence of bowel obstruction on SBFT  -Advance diet as tolerated  -Serial abdominal exams  -IV hydration if not tolerating orals  -NG tube to suction if not tolerating orals      Nausea, vomiting and diarrhea  Assessment & Plan  Improving significantly  Possibly due to food poisoning (positive fecal lactoferrin), no SBO on small bowel follow through   -Trial clear liquid diet and advance as tolerated  -NG tube to suction if not tolerating diet  advancement  -Nausea medications ordered  -IV hydration if not tolerating orals  -Stool  cultures, O&P pending  -Follow up outpatient fecal lactoferrin    Abdominal distention  Assessment & Plan  Improving  In setting of intractable nausea, vomiting, diarrhea but no bowel obstruction  -Monitor     Acute renal failure (HCC)- (present on admission)  Assessment & Plan  Improving  Likely prerenal  -IV fluid hydration with normal saline  -Monitor BMP and assess response  -Avoid IV contrast/nephrotoxins/NSAIDs  -Dose adjust meds for decreased GFR  -Urine electrolytes  -Renal ultrasound      Hypokalemia  Assessment & Plan  Likely due to NG tube suction  -replete as needed    Hypertension  Assessment & Plan  Hold valsartan in setting of renal failure  Continue propanolol    Lactic acidosis  Assessment & Plan  RESOLVED  Continue IV hydration    Hyperglycemia  Assessment & Plan   A1c 5.2         VTE prophylaxis: SCDs/TEDs    I have performed a physical exam and reviewed and updated ROS and Plan today (3/4/2023). In review of yesterday's note (3/3/2023), there are no changes except as documented above.

## 2023-03-04 NOTE — PROGRESS NOTES
"    DATE: 3/4/2023    3/3/2023 Surgical consultation for evaluation of of possible small bowel obstruction.     INTERVAL EVENTS:    Abdominal 1 view with no specific signs of obstruction.  SBFT with no evidence of small bowel obstruction.   (+) BM      - Surgery to sign off.  Please reconsult should the need arise.   Thank you for allowing us to participate in this patients care.     REVIEW OF SYSTEMS:  Abdomen soft and non-tender.      PHYSICAL EXAMINATION:  Vital Signs: /66   Pulse 64   Temp 36.8 °C (98.2 °F) (Temporal)   Resp 18   Ht 1.727 m (5' 8\")   Wt 94.3 kg (207 lb 14.3 oz)   SpO2 93%   Physical Exam  Vitals and nursing note reviewed.   Constitutional:       General: He is not in acute distress.  HENT:      Head: Normocephalic.      Nose:      Comments: Nasogastric tube     Mouth/Throat:      Pharynx: Oropharynx is clear.   Eyes:      General:         Right eye: No discharge.         Left eye: No discharge.   Cardiovascular:      Rate and Rhythm: Normal rate.   Pulmonary:      Effort: No respiratory distress.   Chest:      Chest wall: No tenderness.   Abdominal:      General: There is no distension.      Tenderness: There is no abdominal tenderness. There is no guarding or rebound.   Skin:     General: Skin is warm.   Neurological:      Mental Status: He is alert.      Comments: Conversant   Psychiatric:         Mood and Affect: Mood normal.       LABORATORY VALUES:   Recent Labs     03/02/23  1600 03/03/23  0519 03/04/23  0136   WBC 9.9 5.3 6.7   RBC 5.95 5.17 4.95   HEMOGLOBIN 17.0 14.9 14.2   HEMATOCRIT 52.8* 45.7 43.0   MCV 88.7 88.4 86.9   MCH 28.6 28.8 28.7   MCHC 32.2* 32.6* 33.0*   RDW 47.3 46.8 45.1   PLATELETCT 151* 117* 124*   MPV 13.0* 13.1* 12.2     Recent Labs     03/03/23  0519 03/03/23  1727 03/04/23  0136   SODIUM 142 145 145   POTASSIUM 3.9 3.9 3.5*   CHLORIDE 107 109 108   CO2 22 25 28   GLUCOSE 110* 100* 98   BUN 34* 24* 22   CREATININE 2.28* 1.41* 1.26   CALCIUM 8.3* 8.5 8.5 "     Recent Labs     03/02/23  1600 03/03/23  0519   ASTSGOT 21 24   ALTSGPT 29 30   TBILIRUBIN 1.0 0.6   ALKPHOSPHAT 43 34   GLOBULIN 2.7 2.4   INR 1.20*  --      Recent Labs     03/02/23  1600   INR 1.20*        IMAGING:   [id number] VIEW   Final Result         No specific finding to suggest small bowel obstruction.      DX-ABDOMEN FOR TUBE PLACEMENT   Final Result      NG tube extends into the fundus of the stomach.      CT-ABDOMEN-PELVIS W/O   Final Result         1.  Fluid-filled reactive upper abdominal small bowel loops, appearance suggests component of ileus and/or enteritis.   2.  Trace pericardial effusion   3.  Atherosclerosis and atherosclerotic coronary artery disease      DX-ABDOMEN FOR TUBE PLACEMENT   Final Result         1.  Nasogastric tube with side-port at the gastroesophageal junction, position essentially unchanged.   2.  Retrocardiac opacity favoring atelectasis or infiltrate, similar to prior study.   3.  Cardiomegaly      DX-ABDOMEN FOR TUBE PLACEMENT   Final Result         1.  Nasogastric tube with side-port at the gastroesophageal junction, recommend advancement.   2.  Retrocardiac opacity favoring atelectasis or infiltrate, similar to prior study.   3.  Cardiomegaly      DX-ABDOMEN FOR TUBE PLACEMENT   Final Result         1.  Nasogastric tube terminates at the gastroesophageal junction, recommend advancement.   2.  Retrocardiac opacity favoring atelectasis or infiltrate.   3.  Cardiomegaly      [id number] VIEW   Final Result      1.  There are some dilated air-filled loops of small bowel centrally in the abdomen. Findings could be due to partial or intermittent mechanical obstruction versus ileus.      2.  Possible small bowel wall thickening noted in the right lower quadrant.                  EC-ECHOCARDIOGRAM COMPLETE W/O CONT    (Results Pending)   DX-SMALL BOWEL SERIES    (Results Pending)       Discussed patient condition with Patient and general surgery, DrHarry.

## 2023-03-04 NOTE — CARE PLAN
The patient is Stable - Low risk of patient condition declining or worsening    Shift Goals  Clinical Goals: monitor output from ng tube  Patient Goals: rest and comfort    Progress made toward(s) clinical / shift goals:  Patient ng tube remain patient. Patient rested quietly and comfortably and had no complaints of anxiety and did not have any falls.

## 2023-03-04 NOTE — CARE PLAN
The patient is Watcher - Medium risk of patient condition declining or worsening    Shift Goals  Clinical Goals: Monitor NG tube output, X-ray, bowel follow through  Patient Goals: Rest and discharge    Progress made toward(s) clinical / shift goals:        Problem: Knowledge Deficit - Standard  Goal: Patient and family/care givers will demonstrate understanding of plan of care, disease process/condition, diagnostic tests and medications  Outcome: Progressing  Note:   Patient can demonstrate understanding of plan of care, disease process/condition, diagnostic tests and   medications     Problem: Mobility  Goal: Patient's capacity to carry out activities will improve  Outcome: Progressing  Note: Stand by assist x1      Problem: Pain - Standard  Goal: Alleviation of pain or a reduction in pain to the patient’s comfort goal  Outcome: Progressing  Note: Pain currently not complaining of pain at this time        Patient is not progressing towards the following goals:      Problem: Nutrition  Goal: Patient's nutritional and fluid intake will be adequate or improve  Outcome: Not Met  Note: Patient was NPO at beginning of shift, diet advanced to clear liquids

## 2023-03-04 NOTE — PROGRESS NOTES
0730: Patient was alert and orientated x4 this time morning and the patient had no complaints of nausea and pain.    1030: Patient was complaining of epigastric pain and [facility] medical was notified.    1100: Spoke with [facility] medical again. The MD said to advance the tube by 2-3 inches and that they would do a repeat KUB for the patient.    1115: Tube was advanced and the patient stated that his epigastric pain was feeling better.    1300: [facility] medical said that NG tube was okay to give meds through and to hold decompression for 30 min after meds. Surgery came by to see patient and they said they wanted another day of decompression and that they would reassess him tomorrow.

## 2023-03-05 ENCOUNTER — APPOINTMENT (OUTPATIENT)
Dept: CARDIOLOGY | Facility: MEDICAL CENTER | Age: 73
DRG: 683 | End: 2023-03-05
Payer: MEDICARE

## 2023-03-05 VITALS
SYSTOLIC BLOOD PRESSURE: 134 MMHG | TEMPERATURE: 97.4 F | HEART RATE: 60 BPM | RESPIRATION RATE: 16 BRPM | HEIGHT: 68 IN | OXYGEN SATURATION: 93 % | WEIGHT: 207.89 LBS | BODY MASS INDEX: 31.51 KG/M2 | DIASTOLIC BLOOD PRESSURE: 65 MMHG

## 2023-03-05 LAB
ANION GAP SERPL CALC-SCNC: 9 MMOL/L (ref 7–16)
BUN SERPL-MCNC: 15 MG/DL (ref 8–22)
CALCIUM SERPL-MCNC: 8.4 MG/DL (ref 8.5–10.5)
CHLORIDE SERPL-SCNC: 105 MMOL/L (ref 96–112)
CO2 SERPL-SCNC: 26 MMOL/L (ref 20–33)
CREAT SERPL-MCNC: 0.99 MG/DL (ref 0.5–1.4)
ERYTHROCYTE [DISTWIDTH] IN BLOOD BY AUTOMATED COUNT: 42.6 FL (ref 35.9–50)
GFR SERPLBLD CREATININE-BSD FMLA CKD-EPI: 82 ML/MIN/1.73 M 2
GLUCOSE SERPL-MCNC: 88 MG/DL (ref 65–99)
HCT VFR BLD AUTO: 39.6 % (ref 42–52)
HGB BLD-MCNC: 13.1 G/DL (ref 14–18)
LV EJECT FRACT  99904: 59
MAGNESIUM SERPL-MCNC: 1.9 MG/DL (ref 1.5–2.5)
MCH RBC QN AUTO: 28.7 PG (ref 27–33)
MCHC RBC AUTO-ENTMCNC: 33.1 G/DL (ref 33.7–35.3)
MCV RBC AUTO: 86.8 FL (ref 81.4–97.8)
PLATELET # BLD AUTO: 88 K/UL (ref 164–446)
PMV BLD AUTO: 14 FL (ref 9–12.9)
POTASSIUM SERPL-SCNC: 3.3 MMOL/L (ref 3.6–5.5)
RBC # BLD AUTO: 4.56 M/UL (ref 4.7–6.1)
SODIUM SERPL-SCNC: 140 MMOL/L (ref 135–145)
WBC # BLD AUTO: 8.5 K/UL (ref 4.8–10.8)

## 2023-03-05 PROCEDURE — A9270 NON-COVERED ITEM OR SERVICE: HCPCS | Performed by: STUDENT IN AN ORGANIZED HEALTH CARE EDUCATION/TRAINING PROGRAM

## 2023-03-05 PROCEDURE — 85027 COMPLETE CBC AUTOMATED: CPT

## 2023-03-05 PROCEDURE — 700102 HCHG RX REV CODE 250 W/ 637 OVERRIDE(OP): Performed by: HOSPITALIST

## 2023-03-05 PROCEDURE — 700111 HCHG RX REV CODE 636 W/ 250 OVERRIDE (IP): Performed by: STUDENT IN AN ORGANIZED HEALTH CARE EDUCATION/TRAINING PROGRAM

## 2023-03-05 PROCEDURE — 99238 HOSP IP/OBS DSCHRG MGMT 30/<: CPT | Mod: GC | Performed by: HOSPITALIST

## 2023-03-05 PROCEDURE — 83735 ASSAY OF MAGNESIUM: CPT

## 2023-03-05 PROCEDURE — 80048 BASIC METABOLIC PNL TOTAL CA: CPT

## 2023-03-05 PROCEDURE — 700102 HCHG RX REV CODE 250 W/ 637 OVERRIDE(OP): Performed by: STUDENT IN AN ORGANIZED HEALTH CARE EDUCATION/TRAINING PROGRAM

## 2023-03-05 PROCEDURE — 93306 TTE W/DOPPLER COMPLETE: CPT | Mod: 26 | Performed by: INTERNAL MEDICINE

## 2023-03-05 PROCEDURE — 93306 TTE W/DOPPLER COMPLETE: CPT

## 2023-03-05 PROCEDURE — A9270 NON-COVERED ITEM OR SERVICE: HCPCS | Performed by: HOSPITALIST

## 2023-03-05 PROCEDURE — 36415 COLL VENOUS BLD VENIPUNCTURE: CPT

## 2023-03-05 RX ORDER — MAGNESIUM SULFATE 1 G/100ML
1 INJECTION INTRAVENOUS ONCE
Status: COMPLETED | OUTPATIENT
Start: 2023-03-05 | End: 2023-03-05

## 2023-03-05 RX ORDER — POTASSIUM CHLORIDE 7.45 MG/ML
10 INJECTION INTRAVENOUS
Status: COMPLETED | OUTPATIENT
Start: 2023-03-05 | End: 2023-03-05

## 2023-03-05 RX ADMIN — POTASSIUM BICARBONATE 50 MEQ: 978 TABLET, EFFERVESCENT ORAL at 08:50

## 2023-03-05 RX ADMIN — PROPRANOLOL HYDROCHLORIDE 10 MG: 10 TABLET ORAL at 04:39

## 2023-03-05 RX ADMIN — MAGNESIUM SULFATE HEPTAHYDRATE 1 G: 1 INJECTION, SOLUTION INTRAVENOUS at 08:54

## 2023-03-05 RX ADMIN — POTASSIUM CHLORIDE 10 MEQ: 7.46 INJECTION, SOLUTION INTRAVENOUS at 10:07

## 2023-03-05 RX ADMIN — POTASSIUM CHLORIDE 10 MEQ: 7.46 INJECTION, SOLUTION INTRAVENOUS at 11:32

## 2023-03-05 RX ADMIN — LEVOTHYROXINE SODIUM 175 MCG: 0.17 TABLET ORAL at 04:39

## 2023-03-05 NOTE — CARE PLAN
The patient is Stable - Low risk of patient condition declining or worsening    Shift Goals  Clinical Goals: monitor for ab pain and nv and ab distention  Patient Goals: rest and comfort and advancement of diet    Progress made toward(s) clinical / shift goals:  Patient diet was advanced to Clear. Patient did not have complaints of nausea/vomiting, ab pain or distention. Patient NG tube is clamped. Patient is compliant with medication and treatment orders.

## 2023-03-05 NOTE — DISCHARGE SUMMARY
[facility] Internal Medicine Discharge Summary    Attending: ABRAHAN Lackey M.d.  Senior Resident: Dr. Deutsch  Intern:  Dr. Wall  Contact Number: [phone]    CHIEF COMPLAINT ON ADMISSION  Chief Complaint   Patient presents with    Nausea/Vomiting/Diarrhea     BIBA for vomiting and diarrhea since last night. Pt reports several episodes of near syncope. BP 72/52 for medics, increased to 92/52 with 250ml fluids.Currently 77/49. Denies dizziness while laying in bed. Recent travel to [place] on Tuesday.       Reason for Admission  EMS     Admission Date  3/2/2023    CODE STATUS  Full Code    HPI & HOSPITAL COURSE  This is a 72 y.o. male here with ileus and enteritis likely secondary to food poisoning. He had onset of symptoms approx 6 hours after [place] with intractable nausea/vomiting and also diarrhea. He was admitted and NG was placed to suction with improvement in symptoms. General surgery was consulted regarding possible small bowel obstruction but none was seen on small bowel follow through study. He also had lactic acidosis and an acute kidney injury which resolved with IV fluids. Electrolytes were repleted prior to discharge. By discharge he was tolerating solid food without any symptoms and wanting to go home back to [place].    He was incidentally found to have trace pericardial effusion incidentally on CT scan, however he did not exhibit signs of tamponade and tolerated IV fluid resuscitation without issues. Echo was done while inpatient showing EF of 59%, no effusion noted    Of note he does have thrombocytopenia which he states is chronic due to antiphospholipid syndrome, he already follows with a hem/onc physician in [place].     Therefore, he is discharged in fair and stable condition to home with close outpatient follow-up.    The patient met 2-midnight criteria for an inpatient stay at the time of discharge.    Discharge Date  3/5/2023    Physical Exam on Day of Discharge  Physical  Exam  Constitutional:       Appearance: Normal appearance.   HENT:      Head: Normocephalic and atraumatic.      Right Ear: External ear normal.      Left Ear: External ear normal.      Nose: Nose normal. No congestion.      Mouth/Throat:      Mouth: Mucous membranes are moist.      Pharynx: Oropharynx is clear.   Eyes:      General: No scleral icterus.     Extraocular Movements: Extraocular movements intact.      Pupils: Pupils are equal, round, and reactive to light.   Cardiovascular:      Rate and Rhythm: Normal rate and regular rhythm.      Heart sounds: No murmur heard.  Pulmonary:      Effort: Pulmonary effort is normal.      Breath sounds: Normal breath sounds.   Abdominal:      General: Abdomen is flat.      Palpations: Abdomen is soft.   Musculoskeletal:      Cervical back: Normal range of motion and neck supple. No rigidity.      Right lower leg: No edema.      Left lower leg: No edema.   Skin:     General: Skin is warm and dry.      Capillary Refill: Capillary refill takes less than 2 seconds.   Neurological:      General: No focal deficit present.      Mental Status: He is alert and oriented to person, place, and time.       FOLLOW UP ITEMS POST DISCHARGE  Follow up with hem/onc physician  Follow up with primary care physician    DISCHARGE DIAGNOSES  Principal Problem:    Ileus (HCC) POA: Unknown  Active Problems:    Acute renal failure (HCC) POA: Yes    Abdominal distention POA: Unknown    Nausea, vomiting and diarrhea POA: Unknown    Hyperglycemia POA: Unknown    Lactic acidosis POA: Unknown    Hypertension POA: Unknown    Hypokalemia POA: Unknown  Resolved Problems:    * No resolved hospital problems. *      FOLLOW UP  No future appointments.  No follow-up provider specified.    MEDICATIONS ON DISCHARGE     Medication List        CONTINUE taking these medications        Instructions   aspirin EC 81 MG Tbec  Commonly known as: ECOTRIN   Take 81 mg by mouth every day.  Dose: 81 mg     BERBERINE COMPLEX  PO   Take 1 Tablet by mouth 2 times a day.  Dose: 1 Tablet     Coenzyme Q10 200 MG Caps   Take 200 mg by mouth every day.  Dose: 200 mg     fish oil 1000 MG Caps capsule   Take 1,000 mg by mouth 2 times a day.  Dose: 1,000 mg     gabapentin 300 MG Caps  Commonly known as: NEURONTIN   Take 300 mg by mouth 3 times a day as needed.  Dose: 300 mg     Garlic 1000 MG Caps   Take 1,000 mg by mouth every day.  Dose: 1,000 mg     hydroxychloroquine 200 MG Tabs  Commonly known as: Plaquenil   Take 200 mg by mouth 2 times a day.  Dose: 200 mg     ibuprofen 200 MG Tabs  Commonly known as: MOTRIN   Take 200-600 mg by mouth every 6 hours as needed for Mild Pain.  Dose: 200-600 mg     Levocetirizine Dihydrochloride 5 MG Tabs   Take 5 mg by mouth every day.  Dose: 5 mg     levothyroxine 175 MCG Tabs  Commonly known as: SYNTHROID   Take 175 mcg by mouth every morning on an empty stomach.  Dose: 175 mcg     METAMUCIL FIBER PO   Take 2 Capsules by mouth 2 times a day.  Dose: 2 Capsule     One-A-Day Mens 50+ Tabs   Take 1 Tablet by mouth every day.  Dose: 1 Tablet     propranolol 10 MG Tabs  Commonly known as: INDERAL   Take 10 mg by mouth 2 times a day.  Dose: 10 mg     rosuvastatin 5 MG Tabs  Commonly known as: CRESTOR   Take 5 mg by mouth every evening.  Dose: 5 mg     tamsulosin 0.4 MG capsule  Commonly known as: FLOMAX   Take 0.4 mg by mouth 2 times a day.  Dose: 0.4 mg     traZODone 50 MG Tabs  Commonly known as: DESYREL   Take 50 mg by mouth every evening.  Dose: 50 mg     Vitamin C/Natural Trisha Hips 1000 MG tablet  Generic drug: ascorbic acid   Take 1,000 mg by mouth every day.  Dose: 1,000 mg     Vitamin D-3 125 MCG (5000 UT) Tabs   Take 5,000 Units by mouth every day.  Dose: 5,000 Units            STOP taking these medications      valsartan 160 MG Tabs  Commonly known as: DIOVAN              Allergies  Allergies   Allergen Reactions    Hydrochlorothiazide Rash          Penicillins Rash     Rash all over body        DIET  Orders Placed This Encounter   Procedures    Diet Order Diet: Regular     Standing Status:   Standing     Number of Occurrences:   1     Order Specific Question:   Diet:     Answer:   Regular [1]       ACTIVITY  As tolerated.  Weight bearing as tolerated    CONSULTATIONS  General Surgery    PROCEDURES  N/a    LABORATORY  Lab Results   Component Value Date    SODIUM 140 03/05/2023    POTASSIUM 3.3 (L) 03/05/2023    CHLORIDE 105 03/05/2023    CO2 26 03/05/2023    GLUCOSE 88 03/05/2023    BUN 15 03/05/2023    CREATININE 0.99 03/05/2023        Lab Results   Component Value Date    WBC 8.5 03/05/2023    HEMOGLOBIN 13.1 (L) 03/05/2023    HEMATOCRIT 39.6 (L) 03/05/2023    PLATELETCT 88 (L) 03/05/2023        Total time of the discharge process exceeds 45 minutes.

## 2023-03-07 LAB
BACTERIA BLD CULT: NORMAL
BACTERIA BLD CULT: NORMAL
SIGNIFICANT IND 70042: NORMAL
SIGNIFICANT IND 70042: NORMAL
SITE SITE: NORMAL
SITE SITE: NORMAL
SOURCE SOURCE: NORMAL
SOURCE SOURCE: NORMAL